# Patient Record
Sex: FEMALE | Race: WHITE | NOT HISPANIC OR LATINO | Employment: OTHER | URBAN - NONMETROPOLITAN AREA
[De-identification: names, ages, dates, MRNs, and addresses within clinical notes are randomized per-mention and may not be internally consistent; named-entity substitution may affect disease eponyms.]

---

## 2017-11-22 ENCOUNTER — HOSPITAL ENCOUNTER (EMERGENCY)
Facility: HOSPITAL | Age: 27
Discharge: HOME | End: 2017-11-22
Attending: NURSE PRACTITIONER | Admitting: NURSE PRACTITIONER

## 2017-11-22 VITALS
SYSTOLIC BLOOD PRESSURE: 139 MMHG | DIASTOLIC BLOOD PRESSURE: 79 MMHG | HEART RATE: 79 BPM | TEMPERATURE: 97.7 F | RESPIRATION RATE: 12 BRPM | OXYGEN SATURATION: 99 %

## 2017-11-22 DIAGNOSIS — H66.90 ACUTE OTITIS MEDIA, UNSPECIFIED OTITIS MEDIA TYPE: ICD-10-CM

## 2017-11-22 PROCEDURE — 99203 OFFICE O/P NEW LOW 30 MIN: CPT | Performed by: NURSE PRACTITIONER

## 2017-11-22 PROCEDURE — 99213 OFFICE O/P EST LOW 20 MIN: CPT

## 2017-11-22 RX ORDER — AMOXICILLIN 875 MG
875 TABLET ORAL 2 TIMES DAILY
Qty: 20 TABLET | Refills: 0 | Status: SHIPPED | OUTPATIENT
Start: 2017-11-22 | End: 2018-11-27

## 2017-11-22 RX ORDER — PREDNISONE 20 MG/1
20 TABLET ORAL DAILY
Qty: 5 TABLET | Refills: 0 | Status: SHIPPED | OUTPATIENT
Start: 2017-11-22 | End: 2018-11-27

## 2017-11-22 ASSESSMENT — ENCOUNTER SYMPTOMS
FACIAL SWELLING: 0
NAUSEA: 1
DIZZINESS: 0
FATIGUE: 1
WEAKNESS: 0
HEADACHES: 1
CARDIOVASCULAR NEGATIVE: 1
SINUS PRESSURE: 0
SINUS PAIN: 0
COUGH: 1
DIARRHEA: 0
MUSCULOSKELETAL NEGATIVE: 1
VOMITING: 1
SPEECH DIFFICULTY: 0
NUMBNESS: 0
SORE THROAT: 0
FEVER: 0
PSYCHIATRIC NEGATIVE: 1

## 2017-11-22 NOTE — DISCHARGE INSTRUCTIONS
"  Otitis Media (Middle-Ear Infection) in Adults  1. Amoxicillin 875mg  2 times a day for 10 days  2. Prednisone 20mg a day for 5 days  3. Drink plenty fluids.  4. Follow up with your Provider-re \"Migraines' Return if HA worsens.      Otitis media is another name for a middle-ear infection. It means an infection behind your eardrum. This kind of ear infection can happen after any condition that keeps fluid from draining from the middle ear. These conditions include allergies, a cold, a sore throat, or a respiratory infection.  Middle-ear infections are common in children, but they can also happen in adults. An ear infection in an adult may mean a more serious problem than in a child. So you may need additional tests. If you have an ear infection, you should see your health care provider for treatment.  What are the types of middle-ear infections?  Infections can affect the middle ear in several ways. They are:    Acute otitis media. This middle-ear infection occurs suddenly. It causes swelling and redness. Fluid and mucus become trapped inside the ear. You can have a fever and ear pain.    Otitis media with effusion. Fluid (effusion) and mucus build up in the middle ear after the infection goes away. You may feel like your middle ear is full. This can continue for months and may affect your hearing.    Chronic otitis media with effusion. Fluid (effusion) remains in the middle ear for a long time. Or it builds up again and again, even though there is no infection. This type of middle-ear infection may be hard to treat. It may also affect your hearing.  Who is more likely to get a middle-ear infection?  You are more likely to get an ear infection if you:    Smoke or are around someone who smokes    Have seasonal or year-round allergy symptoms    Have a cold or other upper respiratory infection  What causes a middle-ear infection?  The middle ear connects to the throat by a canal called the eustachian tube. This tube " helps even out the pressure between the outer ear and the inner ear. A cold or allergy can irritate the tube or cause the area around it to swell. This can keep fluid from draining from the middle ear. The fluid builds up behind the eardrum. Bacteria and viruses can grow in this fluid. The bacteria and viruses cause the middle-ear infection.  What are the symptoms of a middle-ear infection?  Common symptoms of a middle-ear infection in adults are:    Pain in 1 or both ears    Drainage from the ear    Muffled hearing    Sore throat   You may also have a fever. Rarely, your balance can be affected.  These symptoms may be the same as for other conditions. It s important to talk with your health care provider if you think you have a middle-ear infection. If you have a high fever, severe pain behind your ear, or paralysis in your face, see your provider as soon as you can.  How is a middle-ear infection diagnosed?  Your health care provider will take a medical history and do a physical exam. He or she will look at the outer ear and eardrum with an otoscope. The otoscope is a lighted tool that lets your provider see inside the ear. A pneumatic otoscope blows a puff of air into the ear to check how well your eardrum moves. If you eardrum doesn t move well, it may mean you have fluid behind it.  Your provider may also do a test called tympanometry. This test tells how well the middle ear is working. It can find any changes in pressure in the middle ear. Your provider may test your hearing with a tuning fork.  How is a middle-ear infection treated?  A middle-ear infection may be treated with:    Antibiotics, taken by mouth or as ear drops    Medication for pain    Decongestants, antihistamines, or nasal steroids  Your health care provider may also have you try autoinsufflation. This helps adjust the air pressure in your ear. For this, you pinch your nose and gently exhale. This forces air back through the eustachian  tube.  The exact treatment for your ear infection will depend on the type of infection you have. In general, if your symptoms don t get better in 48 to 72 hours, contact your health care provider.  Middle-ear infections can cause long-term problems if not treated. They can lead to:    Infection in other parts of the head    Permanent hearing loss    Paralysis of a nerve in your face  If you have a middle-ear infection that doesn t get better, you may need to see an ear, nose, and throat specialist (otolaryngologist). You may need a CT scan or MRI to check for head and neck cancer.  Ear tubes  Sometimes fluid stays in the middle ear even after you take antibiotics and the infection goes away. In this case, your health care provider may suggest that a small tube be placed in your ear. The tube is put at the opening of the eardrum. The tube keeps fluid from building up and relieves pressure in the middle ear. It can also help you hear better. This surgery is called myringotomy. It is not often done in adults.  The tubes usually fall out on their own after 6 months to a year.    0077-7618 [employer]. [address] [place] [address]. All rights reserved. This information is not intended as a substitute for professional medical care. Always follow your healthcare professional's instructions.          Middle Ear Infection (Adult)  You have an infection of the middle ear, the space behind the eardrum. This is also called acute otitis media (AOM). Sometimes it is caused by the common cold. This is because congestion can block the internal passage (eustachian tube) that drains fluid from the middle ear. When the middle ear fills with fluid, bacteria can grow there and cause an infection. Oral antibiotics are used to treat this illness, not ear drops. Symptoms usually start to improve within 1 to 2 days of treatment.    Home care  The following are general care guidelines:    Finish all of the  antibiotic medicine given, even though you may feel better after the first few days.    You may use over-the-counter medicine, such as acetaminophen or ibuprofen, to control pain and fever, unless something else was prescribed. If you have chronic liver or kidney disease or have ever had a stomach ulcer or gastrointestinal bleeding, talk with your healthcare provider before using these medicines. Do not give aspirin to anyone under 18 years of age who has a fever. It may cause severe illness or death.  Follow-up care  Follow up with your healthcare provider, or as advised, in 2 weeks if all symptoms have not gotten better, or if hearing doesn't go back to normal within 1 month.  When to seek medical advice  Call your healthcare provider right away if any of these occur:    Ear pain gets worse or does not improve after 3 days of treatment    Unusual drowsiness or confusion    Neck pain, stiff neck, or headache    Fluid or blood draining from the ear canal    Fever of 100.4 F (38 C) or as advised     Seizure  Date Last Reviewed: 6/1/2016 2000-2017 [employer]. [address] [place] [address], [place] [address]. All rights reserved. This information is not intended as a substitute for professional medical care. Always follow your healthcare professional's instructions.

## 2017-11-22 NOTE — ED NOTES
Pt presents with a headache to frontal area of forehead and beneath both eyes, states that she was nauseated and vomited last week from it. Pt has had these sx for 3 weeks

## 2017-11-22 NOTE — ED PROVIDER NOTES
History     Chief Complaint   Patient presents with     Headache     frontal, facial X 3 weeks     HPI  Woody Eller is a 27 year old female whohas had Headache that won't go away for 3 weeks.  No URI in beginning. No fever, ear pain, or sore throat.  No postnasal drip-thought sinus, but has no drainage.  Upper teeth hurt-but grinds teeth, and just got a splint to wear at night.  Has TMJ.  Is light sensitive,  No other blurring.  Has to sleep to dull the HA-Ibuprofen and excedrin has not worked  Has had some nausea and vomiting.  Has CO2 detector in house so low probability exposed to carbon monoxide.      Problem List:    Patient Active Problem List    Diagnosis Date Noted     Vomiting, persistent, in adult 2016     Priority: Medium     Acute pyelonephritis 2016     Priority: Medium     Persistent vomiting in adult patient 2016     Priority: Medium     Elevated liver enzymes 2016     Priority: Medium     Status post  delivery 2013     Priority: Medium     Diagnosis updated by automated process. Provider to review and confirm.       Status post  delivery 2013     Priority: Medium     Obesity 2013     Priority: Medium     SVT (supraventricular tachycardia) (H) 2013     Priority: Medium     Drug use 1st trimester 2013     Priority: Medium        Past Medical History:    Past Medical History:   Diagnosis Date     Asthma      Seasonal allergies      SVT (supraventricular tachycardia) (H)        Past Surgical History:    Past Surgical History:   Procedure Laterality Date      SECTION  2013    Procedure:  SECTION;;  Surgeon: Mally Danielson MD;  Location: [facility]     TONSILLECTOMY         Family History:    Family History   Problem Relation Age of Onset     DIABETES Sister      Type 1     DIABETES Paternal Grandmother      Type 2     Thyroid Disease Mother      Hypothyroidism     HEART DISEASE Maternal Grandmother       Pacemaker     Thyroid Disease Maternal Grandmother      Thyroid Disorder       Social History:  Marital Status:  Single [1]  Social History   Substance Use Topics     Smoking status: Former Smoker     Smokeless tobacco: Never Used     Alcohol use No        Medications:      amoxicillin (AMOXIL) 875 MG tablet   predniSONE (DELTASONE) 20 MG tablet   Diazepam (VALIUM PO)   ibuprofen (ADVIL,MOTRIN) 800 MG tablet         Review of Systems   Constitutional: Positive for fatigue. Negative for fever.   HENT: Negative for congestion, ear pain, facial swelling, postnasal drip, sinus pain, sinus pressure and sore throat.    Respiratory: Positive for cough.    Cardiovascular: Negative.    Gastrointestinal: Positive for nausea and vomiting. Negative for diarrhea.        With HA.     Genitourinary: Negative.    Musculoskeletal: Negative.    Skin: Negative.    Neurological: Positive for headaches. Negative for dizziness, speech difficulty, weakness and numbness.   Psychiatric/Behavioral: Negative.        Physical Exam   BP: 139/79  Pulse: 79  Temp: 97.7  F (36.5  C)  Resp: 12  SpO2: 99 %      Physical Exam   Constitutional: She is oriented to person, place, and time. She appears well-developed and well-nourished.   HENT:   Left Ear: External ear normal.   Nose: Nose normal.   Mouth/Throat: Oropharynx is clear and moist.   Right TM red, retracted.  Left-pearly gray partially obscured by cerumen.  Has Audible click in TMJ joint.    Eyes: Conjunctivae and EOM are normal. Pupils are equal, round, and reactive to light.   Neck: Normal range of motion. Neck supple. No thyromegaly present.   Cardiovascular: Normal rate, regular rhythm and normal heart sounds.    No murmur heard.  Pulmonary/Chest: Effort normal and breath sounds normal.   Musculoskeletal: Normal range of motion.   Lymphadenopathy:     She has no cervical adenopathy.   Neurological: She is alert and oriented to person, place, and time. No cranial nerve deficit.   Skin:  Skin is warm and dry. No rash noted.   Psychiatric: She has a normal mood and affect.       ED Course     ED Course     Procedures      Labs Ordered and Resulted from Time of ED Arrival Up to the Time of Departure from the ED - No data to display    Assessments & Plan (with Medical Decision Making)     I have reviewed the nursing notes.    I have reviewed the findings, diagnosis, plan and need for follow up with the patient.Discussed doing CT of head for these ongoing headaches-she calls Migraines.  -especially since had HA for 3 weeks-But also has the ear infection.  Will see Dr. Arin Lopez on Friday.        New Prescriptions    AMOXICILLIN (AMOXIL) 875 MG TABLET    Take 1 tablet (875 mg) by mouth 2 times daily    PREDNISONE (DELTASONE) 20 MG TABLET    Take 1 tablet (20 mg) by mouth daily       Final diagnoses:   Acute otitis media, unspecified otitis media type     ASSESSMENT / PLAN:  (H66.90) Acute otitis media, unspecified otitis media type  Comment:   Plan:   1.  Amoxicillin 875mg BID for 10 days  2.  Prednisone 20mg a day for 5 days  3. Drink plenty fluids  4.  Follow up with Provider-re: headaches.  Return to ER if worsens.        11/22/2017   [facility]     Delon Vee NP  11/22/17 1439       Delon Vee NP  11/22/17 1219

## 2017-11-22 NOTE — ED AVS SNAPSHOT
[facility]    [address] [address]    Phone:  [phone]                                       Woody Eller   MRN: [id number]    Department:  [facility]   Date of Visit:  11/22/2017           After Visit Summary Signature Page     I have received my discharge instructions, and my questions have been answered. I have discussed any challenges I see with this plan with the nurse or doctor.    ..........................................................................................................................................  Patient/Patient Representative Signature      ..........................................................................................................................................  Patient Representative Print Name and Relationship to Patient    ..................................................               ................................................  Date                                            Time    ..........................................................................................................................................  Reviewed by Signature/Title    ...................................................              ..............................................  Date                                                            Time

## 2017-11-22 NOTE — ED AVS SNAPSHOT
" [facility]    [address] [address]    Phone:  [phone]                                       Woody Eller   MRN: [id number]    Department:  [facility]   Date of Visit:  11/22/2017           Patient Information     Date Of Birth          1990        Your diagnoses for this visit were:     Acute otitis media, unspecified otitis media type        You were seen by Delon Vee NP.      Follow-up Information     Follow up with Annette Ervin MD.    Specialty:  Family Practice    Contact information:    [facility]  [address] [facility] [address]  [phone]          Discharge Instructions         Otitis Media (Middle-Ear Infection) in Adults  1. Amoxicillin 875mg  2 times a day for 10 days  2. Prednisone 20mg a day for 5 days  3. Drink plenty fluids.  4. Follow up with your Provider-re \"Migraines' Return if HA worsens.      Otitis media is another name for a middle-ear infection. It means an infection behind your eardrum. This kind of ear infection can happen after any condition that keeps fluid from draining from the middle ear. These conditions include allergies, a cold, a sore throat, or a respiratory infection.  Middle-ear infections are common in children, but they can also happen in adults. An ear infection in an adult may mean a more serious problem than in a child. So you may need additional tests. If you have an ear infection, you should see your health care provider for treatment.  What are the types of middle-ear infections?  Infections can affect the middle ear in several ways. They are:    Acute otitis media. This middle-ear infection occurs suddenly. It causes swelling and redness. Fluid and mucus become trapped inside the ear. You can have a fever and ear pain.    Otitis media with effusion. Fluid (effusion) and mucus build up in the middle ear after the infection goes away. You may feel like your middle ear is " full. This can continue for months and may affect your hearing.    Chronic otitis media with effusion. Fluid (effusion) remains in the middle ear for a long time. Or it builds up again and again, even though there is no infection. This type of middle-ear infection may be hard to treat. It may also affect your hearing.  Who is more likely to get a middle-ear infection?  You are more likely to get an ear infection if you:    Smoke or are around someone who smokes    Have seasonal or year-round allergy symptoms    Have a cold or other upper respiratory infection  What causes a middle-ear infection?  The middle ear connects to the throat by a canal called the eustachian tube. This tube helps even out the pressure between the outer ear and the inner ear. A cold or allergy can irritate the tube or cause the area around it to swell. This can keep fluid from draining from the middle ear. The fluid builds up behind the eardrum. Bacteria and viruses can grow in this fluid. The bacteria and viruses cause the middle-ear infection.  What are the symptoms of a middle-ear infection?  Common symptoms of a middle-ear infection in adults are:    Pain in 1 or both ears    Drainage from the ear    Muffled hearing    Sore throat   You may also have a fever. Rarely, your balance can be affected.  These symptoms may be the same as for other conditions. It s important to talk with your health care provider if you think you have a middle-ear infection. If you have a high fever, severe pain behind your ear, or paralysis in your face, see your provider as soon as you can.  How is a middle-ear infection diagnosed?  Your health care provider will take a medical history and do a physical exam. He or she will look at the outer ear and eardrum with an otoscope. The otoscope is a lighted tool that lets your provider see inside the ear. A pneumatic otoscope blows a puff of air into the ear to check how well your eardrum moves. If you eardrum doesn t  move well, it may mean you have fluid behind it.  Your provider may also do a test called tympanometry. This test tells how well the middle ear is working. It can find any changes in pressure in the middle ear. Your provider may test your hearing with a tuning fork.  How is a middle-ear infection treated?  A middle-ear infection may be treated with:    Antibiotics, taken by mouth or as ear drops    Medication for pain    Decongestants, antihistamines, or nasal steroids  Your health care provider may also have you try autoinsufflation. This helps adjust the air pressure in your ear. For this, you pinch your nose and gently exhale. This forces air back through the eustachian tube.  The exact treatment for your ear infection will depend on the type of infection you have. In general, if your symptoms don t get better in 48 to 72 hours, contact your health care provider.  Middle-ear infections can cause long-term problems if not treated. They can lead to:    Infection in other parts of the head    Permanent hearing loss    Paralysis of a nerve in your face  If you have a middle-ear infection that doesn t get better, you may need to see an ear, nose, and throat specialist (otolaryngologist). You may need a CT scan or MRI to check for head and neck cancer.  Ear tubes  Sometimes fluid stays in the middle ear even after you take antibiotics and the infection goes away. In this case, your health care provider may suggest that a small tube be placed in your ear. The tube is put at the opening of the eardrum. The tube keeps fluid from building up and relieves pressure in the middle ear. It can also help you hear better. This surgery is called myringotomy. It is not often done in adults.  The tubes usually fall out on their own after 6 months to a year.    [id number] [employer]. [address] [place] [address], [place] [address]. All rights reserved. This information is not intended as a substitute for professional medical  care. Always follow your healthcare professional's instructions.          Middle Ear Infection (Adult)  You have an infection of the middle ear, the space behind the eardrum. This is also called acute otitis media (AOM). Sometimes it is caused by the common cold. This is because congestion can block the internal passage (eustachian tube) that drains fluid from the middle ear. When the middle ear fills with fluid, bacteria can grow there and cause an infection. Oral antibiotics are used to treat this illness, not ear drops. Symptoms usually start to improve within 1 to 2 days of treatment.    Home care  The following are general care guidelines:    Finish all of the antibiotic medicine given, even though you may feel better after the first few days.    You may use over-the-counter medicine, such as acetaminophen or ibuprofen, to control pain and fever, unless something else was prescribed. If you have chronic liver or kidney disease or have ever had a stomach ulcer or gastrointestinal bleeding, talk with your healthcare provider before using these medicines. Do not give aspirin to anyone under 18 years of age who has a fever. It may cause severe illness or death.  Follow-up care  Follow up with your healthcare provider, or as advised, in 2 weeks if all symptoms have not gotten better, or if hearing doesn't go back to normal within 1 month.  When to seek medical advice  Call your healthcare provider right away if any of these occur:    Ear pain gets worse or does not improve after 3 days of treatment    Unusual drowsiness or confusion    Neck pain, stiff neck, or headache    Fluid or blood draining from the ear canal    Fever of 100.4 F (38 C) or as advised     Seizure  Date Last Reviewed: 6/1/2016 2000-2017 [employer]. [address] [place] [address], [place] [address]. All rights reserved. This information is not intended as a substitute for professional medical care. Always follow your healthcare  professional's instructions.             Review of your medicines      START taking        Dose / Directions Last dose taken    amoxicillin 875 MG tablet   Commonly known as:  AMOXIL   Dose:  875 mg   Quantity:  20 tablet        Take 1 tablet (875 mg) by mouth 2 times daily   Refills:  0        predniSONE 20 MG tablet   Commonly known as:  DELTASONE   Dose:  20 mg   Quantity:  5 tablet        Take 1 tablet (20 mg) by mouth daily   Refills:  0          Our records show that you are taking the medicines listed below. If these are incorrect, please call your family doctor or clinic.        Dose / Directions Last dose taken    ibuprofen 800 MG tablet   Commonly known as:  ADVIL/MOTRIN   Dose:  800 mg   Quantity:  40 tablet        Take 1 tablet (800 mg) by mouth every 8 hours as needed for pain   Refills:  1        VALIUM PO        Take by mouth as needed for anxiety   Refills:  0                Prescriptions were sent or printed at these locations (2 Prescriptions)                   [facility]  [address]   [address] [facility] [place] [address]    Telephone:  [phone]   Fax:  [phone]   Hours:                  E-Prescribed (2 of 2)         amoxicillin (AMOXIL) 875 MG tablet               predniSONE (DELTASONE) 20 MG tablet                Orders Needing Specimen Collection     None      Pending Results     No orders found from 11/20/2017 to 11/23/2017.            Pending Culture Results     No orders found from 11/20/2017 to 11/23/2017.            Thank you for choosing [facility]       Thank you for choosing [facility] for your care. Our goal is always to provide you with excellent care. Hearing back from our patients is one way we can continue to improve our services. Please take a few minutes to complete the written survey that you may receive in the mail after you visit with us. Thank you!        [employer] Information     Integrated biometrics lets you send messages to your doctor, view your test  "results, renew your prescriptions, schedule appointments and more. To sign up, go to [email or web address] . Click on \"Log in\" on the left side of the screen, which will take you to the Welcome page. Then click on \"Sign up Now\" on the right side of the page.     You will be asked to enter the access code listed below, as well as some personal information. Please follow the directions to create your username and password.     Your access code is: [email or web address]  Expires: 2018  2:23 PM     Your access code will  in 90 days. If you need help or a new code, please call your [facility] clinic or [phone].        Care EveryWhere ID     This is your Care EveryWhere ID. This could be used by other organizations to access your [facility] medical records  [id number]        Equal Access to Services     ISMA PABLO : Hadshasha Cabello, enedelia leung, jessica michelalmapérez snow, mariela vang . So [facility] [phone].    ATENCIÓN: Si habla español, tiene a poe disposición servicios gratuitos de asistencia lingüística. Llame al [phone].    We comply with applicable federal civil rights laws and [place] laws. We do not discriminate on the basis of race, color, national origin, age, disability, sex, sexual orientation, or gender identity.            After Visit Summary       This is your record. Keep this with you and show to your community pharmacist(s) and doctor(s) at your next visit.                  "

## 2018-10-03 ENCOUNTER — HOSPITAL ENCOUNTER (EMERGENCY)
Facility: HOSPITAL | Age: 28
Discharge: HOME | End: 2018-10-03
Attending: NURSE PRACTITIONER | Admitting: NURSE PRACTITIONER
Payer: COMMERCIAL

## 2018-10-03 VITALS
RESPIRATION RATE: 14 BRPM | OXYGEN SATURATION: 97 % | DIASTOLIC BLOOD PRESSURE: 80 MMHG | SYSTOLIC BLOOD PRESSURE: 127 MMHG | TEMPERATURE: 98.5 F

## 2018-10-03 DIAGNOSIS — J01.00 SUBACUTE MAXILLARY SINUSITIS: ICD-10-CM

## 2018-10-03 DIAGNOSIS — J40 BRONCHITIS: ICD-10-CM

## 2018-10-03 PROCEDURE — 99213 OFFICE O/P EST LOW 20 MIN: CPT | Performed by: NURSE PRACTITIONER

## 2018-10-03 PROCEDURE — G0463 HOSPITAL OUTPT CLINIC VISIT: HCPCS

## 2018-10-03 RX ORDER — ONDANSETRON 4 MG/1
4 TABLET, ORALLY DISINTEGRATING ORAL EVERY 8 HOURS PRN
Qty: 10 TABLET | Refills: 0 | Status: SHIPPED | OUTPATIENT
Start: 2018-10-03 | End: 2018-10-06

## 2018-10-03 RX ORDER — CODEINE PHOSPHATE AND GUAIFENESIN 10; 100 MG/5ML; MG/5ML
1-2 SOLUTION ORAL EVERY 4 HOURS PRN
Qty: 180 ML | Refills: 0 | Status: SHIPPED | OUTPATIENT
Start: 2018-10-03 | End: 2018-11-27

## 2018-10-03 RX ORDER — AZITHROMYCIN 250 MG/1
TABLET, FILM COATED ORAL
Qty: 6 TABLET | Refills: 0 | Status: SHIPPED | OUTPATIENT
Start: 2018-10-03 | End: 2018-10-08

## 2018-10-03 ASSESSMENT — ENCOUNTER SYMPTOMS
SINUS PRESSURE: 1
NAUSEA: 1
DIAPHORESIS: 1
CONSTIPATION: 1
HEADACHES: 1
MYALGIAS: 1
CHILLS: 1
DIZZINESS: 0
VOMITING: 1
APPETITE CHANGE: 1
SORE THROAT: 0
DIFFICULTY URINATING: 0
FATIGUE: 1
PALPITATIONS: 0
ABDOMINAL PAIN: 0
COUGH: 1
DIARRHEA: 0
ARTHRALGIAS: 1
FEVER: 0
RHINORRHEA: 1
PSYCHIATRIC NEGATIVE: 1
SHORTNESS OF BREATH: 0

## 2018-10-03 NOTE — DISCHARGE INSTRUCTIONS
Bronchitis, Antibiotic Treatment (Adult)  1. Zpak  500mg today, 250mg a day for 4 days  2. Robitussin AC  For cough 1-2 tsp every 4-6 hours.    3.  Zofran 4mg  Up to every 8 hours as needed for nausea.   4. Keep well hydrated.         Bronchitis is an infection of the air passages (bronchial tubes) in your lungs. It often occurs when you have a cold. This illness is contagious during the first few days and is spread through the air by coughing and sneezing, or by direct contact (touching the sick person and then touching your own eyes, nose, or mouth).  Symptoms of bronchitis include cough with mucus (phlegm) and low-grade fever. Bronchitis usually lasts 7 to 14 days. Mild cases can be treated with simple home remedies. More severe infection is treated with an antibiotic.  Home care  Follow these guidelines when caring for yourself at home:    If your symptoms are severe, rest at home for the first 2 to 3 days. When you go back to your usual activities, don't let yourself get too tired.    Do not smoke. Also avoid being exposed to secondhand smoke.    You may use over-the-counter medicines to control fever or pain, unless another medicine was prescribed. If you have chronic liver or kidney disease or have ever had a stomach ulcer or gastrointestinal bleeding, talk with your healthcare provider before using these medicines. Also talk to your provider if you are taking medicine to prevent blood clots. Aspirin should never be given to anyone younger than 18 years of age who is ill with a viral infection or fever. It may cause severe liver or brain damage.    Your appetite may be poor, so a light diet is fine. Avoid dehydration by drinking 6 to 8 glasses of fluids per day (such as water, soft drinks, sports drinks, juices, tea, or soup). Extra fluids will help loosen secretions in the nose and lungs.    Over-the-counter cough, cold, and sore-throat medicines will not shorten the length of the illness, but they may be  helpful to reduce symptoms. (Note: Do not use decongestants if you have high blood pressure.)    Finish all antibiotic medicine. Do this even if you are feeling better after only a few days.  Follow-up care  Follow up with your healthcare provider, or as advised. If you had an X-ray or ECG (electrocardiogram), a specialist will review it. You will be notified of any new findings that may affect your care.  If you are age 65 or older, or if you have a chronic lung disease or condition that affects your immune system, or you smoke, ask your healthcare provider about getting a pneumococcal vaccine and a yearly flu shot (influenza vaccine).  When to seek medical advice  Call your healthcare provider right away if any of these occur:    Fever of 100.4 F (38 C) or higher, or as directed by your healthcare provider    Coughing up increased amounts of colored sputum    Weakness, drowsiness, headache, facial pain, ear pain, or a stiff neck  Call 911  Call 911 if any of these occur.    Coughing up blood    Worsening weakness, drowsiness, headache, or stiff neck    Trouble breathing, wheezing, or pain with breathing  Date Last Reviewed: 9/13/2015 2000-2017 [employer]. [address] [place] [address], [place] [address]. All rights reserved. This information is not intended as a substitute for professional medical care. Always follow your healthcare professional's instructions.

## 2018-10-03 NOTE — ED PROVIDER NOTES
History     Chief Complaint   Patient presents with     Headache     c/o migraine ha, vomiting, body aches and head cold     HPI  Woody Clark is a 28 year old female who has been sick for 6 days with  Chills,  HA,   +postnasal drip, sinus pressure,  Gums ache,  Cough, vomiting up much phlegmy vomit. Body aches.  No Diarrhea,  No ear ache.      Problem List:    Patient Active Problem List    Diagnosis Date Noted     Vomiting, persistent, in adult 2016     Priority: Medium     Acute pyelonephritis 2016     Priority: Medium     Persistent vomiting in adult patient 2016     Priority: Medium     Elevated liver enzymes 2016     Priority: Medium     Status post  delivery 2013     Priority: Medium     Diagnosis updated by automated process. Provider to review and confirm.       Status post  delivery 2013     Priority: Medium     Obesity 2013     Priority: Medium     SVT (supraventricular tachycardia) (H) 2013     Priority: Medium     Drug use 1st trimester 2013     Priority: Medium        Past Medical History:    Past Medical History:   Diagnosis Date     Asthma      Seasonal allergies      SVT (supraventricular tachycardia) (H)        Past Surgical History:    Past Surgical History:   Procedure Laterality Date      SECTION  2013    Procedure:  SECTION;;  Surgeon: Mally Danielson MD;  Location: [facility]     TONSILLECTOMY         Family History:    Family History   Problem Relation Age of Onset     Diabetes Sister      Type 1     Diabetes Paternal Grandmother      Type 2     Thyroid Disease Mother      Hypothyroidism     HEART DISEASE Maternal Grandmother      Pacemaker     Thyroid Disease Maternal Grandmother      Thyroid Disorder       Social History:  Marital Status:   [2]  Social History   Substance Use Topics     Smoking status: Former Smoker     Smokeless tobacco: Never Used     Alcohol use No        Medications:       azithromycin (ZITHROMAX Z-RYANNE) 250 MG tablet   guaiFENesin-codeine (ROBITUSSIN AC) 100-10 MG/5ML SOLN solution   ondansetron (ZOFRAN ODT) 4 MG ODT tab   amoxicillin (AMOXIL) 875 MG tablet   Diazepam (VALIUM PO)   ibuprofen (ADVIL,MOTRIN) 800 MG tablet   predniSONE (DELTASONE) 20 MG tablet         Review of Systems   Constitutional: Positive for appetite change, chills, diaphoresis and fatigue. Negative for fever.   HENT: Positive for congestion, ear pain, postnasal drip, rhinorrhea and sinus pressure. Negative for sore throat.         Gums ache.     Respiratory: Positive for cough. Negative for shortness of breath.    Cardiovascular: Negative for chest pain, palpitations and leg swelling.   Gastrointestinal: Positive for constipation, nausea and vomiting. Negative for abdominal pain and diarrhea.   Genitourinary: Negative for difficulty urinating.   Musculoskeletal: Positive for arthralgias and myalgias.   Neurological: Positive for headaches. Negative for dizziness.   Psychiatric/Behavioral: Negative.        Physical Exam   BP: 127/80  Heart Rate: 81  Temp: 98.5  F (36.9  C)  Resp: 14  SpO2: 97 %      Physical Exam   Constitutional: She is oriented to person, place, and time. She appears well-developed and well-nourished.   HENT:   Left and right TM's obscured by dry cerumen.   Nares red , swollen,  Oropharynx reddened.    Eyes: Conjunctivae and EOM are normal. Pupils are equal, round, and reactive to light.   Neck: Normal range of motion. Neck supple. No thyromegaly present.   Feels clammy.    Cardiovascular: Normal rate, regular rhythm and normal heart sounds.    No murmur heard.  Pulmonary/Chest: Effort normal.   Breath sounds decreased posteriorly     Musculoskeletal: Normal range of motion.   Lymphadenopathy:     She has no cervical adenopathy.   Neurological: She is alert and oriented to person, place, and time.   Skin: Skin is warm and dry.   Psychiatric: She has a normal mood and affect.       ED Course      ED Course     Procedures                   No results found for this or any previous visit (from the past 24 hour(s)).    Medications - No data to display    Assessments & Plan (with Medical Decision Making)     I have reviewed the nursing notes.    I have reviewed the findings, diagnosis, plan and need for follow up with the patient.      New Prescriptions    AZITHROMYCIN (ZITHROMAX Z-RYANNE) 250 MG TABLET    Two tablets on the first day, then one tablet daily for the next 4 days    GUAIFENESIN-CODEINE (ROBITUSSIN AC) 100-10 MG/5ML SOLN SOLUTION    Take 5-10 mLs by mouth every 4 hours as needed    ONDANSETRON (ZOFRAN ODT) 4 MG ODT TAB    Take 1 tablet (4 mg) by mouth every 8 hours as needed for nausea       Final diagnoses:   Subacute maxillary sinusitis   Bronchitis     ASSESSMENT / PLAN:  (J01.00) Subacute maxillary sinusitis  Comment:   Plan: 1. Zpak  500mg today , 250mg a day for 4 days.   2. Zofran 4mg  Up to every 8 hours for nausea/vomiting.       (J40) Bronchitis  Comment:   Plan:  1.   Zpak 500mg today, 250mg a day for 4 days  2. Robitussin AC 1-2 tsp every 4-6 hour for cough.        10/3/2018   [facility]     Delon Vee NP  10/03/18 [id number]

## 2018-10-03 NOTE — ED AVS SNAPSHOT
[facility]    [address] [address]    Phone:  [phone]                                       Woody Clark   MRN: [id number]    Department[facility]  [facility]   Date of Visit:  10/3/2018           Patient Information     Date Of Birth          1990        Your diagnoses for this visit were:     Subacute maxillary sinusitis     Bronchitis        You were seen by Delon Vee NP.      Follow-up Information     Follow up with Annette Ervin MD.    Specialty:  Family Practice    Contact information:    [facility]  [address] [facility] [address]  [phone]          Discharge Instructions         Bronchitis, Antibiotic Treatment (Adult)  1. Zpak  500mg today, 250mg a day for 4 days  2. Robitussin AC  For cough 1-2 tsp every 4-6 hours.    3.  Zofran 4mg  Up to every 8 hours as needed for nausea.   4. Keep well hydrated.         Bronchitis is an infection of the air passages (bronchial tubes) in your lungs. It often occurs when you have a cold. This illness is contagious during the first few days and is spread through the air by coughing and sneezing, or by direct contact (touching the sick person and then touching your own eyes, nose, or mouth).  Symptoms of bronchitis include cough with mucus (phlegm) and low-grade fever. Bronchitis usually lasts 7 to 14 days. Mild cases can be treated with simple home remedies. More severe infection is treated with an antibiotic.  Home care  Follow these guidelines when caring for yourself at home:    If your symptoms are severe, rest at home for the first 2 to 3 days. When you go back to your usual activities, don't let yourself get too tired.    Do not smoke. Also avoid being exposed to secondhand smoke.    You may use over-the-counter medicines to control fever or pain, unless another medicine was prescribed. If you have chronic liver or kidney disease or have ever had a stomach ulcer or  gastrointestinal bleeding, talk with your healthcare provider before using these medicines. Also talk to your provider if you are taking medicine to prevent blood clots. Aspirin should never be given to anyone younger than 18 years of age who is ill with a viral infection or fever. It may cause severe liver or brain damage.    Your appetite may be poor, so a light diet is fine. Avoid dehydration by drinking 6 to 8 glasses of fluids per day (such as water, soft drinks, sports drinks, juices, tea, or soup). Extra fluids will help loosen secretions in the nose and lungs.    Over-the-counter cough, cold, and sore-throat medicines will not shorten the length of the illness, but they may be helpful to reduce symptoms. (Note: Do not use decongestants if you have high blood pressure.)    Finish all antibiotic medicine. Do this even if you are feeling better after only a few days.  Follow-up care  Follow up with your healthcare provider, or as advised. If you had an X-ray or ECG (electrocardiogram), a specialist will review it. You will be notified of any new findings that may affect your care.  If you are age 65 or older, or if you have a chronic lung disease or condition that affects your immune system, or you smoke, ask your healthcare provider about getting a pneumococcal vaccine and a yearly flu shot (influenza vaccine).  When to seek medical advice  Call your healthcare provider right away if any of these occur:    Fever of 100.4 F (38 C) or higher, or as directed by your healthcare provider    Coughing up increased amounts of colored sputum    Weakness, drowsiness, headache, facial pain, ear pain, or a stiff neck  Call 911  Call 911 if any of these occur.    Coughing up blood    Worsening weakness, drowsiness, headache, or stiff neck    Trouble breathing, wheezing, or pain with breathing  Date Last Reviewed: 9/13/2015 2000-2017 [employer]. [address] [place] [address], [place] [address]. All rights  reserved. This information is not intended as a substitute for professional medical care. Always follow your healthcare professional's instructions.          Discharge References/Attachments     SINUSITIS (ANTIBIOTIC TREATMENT) (ENGLISH)    BRONCHITIS, ANTIBIOTIC TREATMENT (ADULT) (ENGLISH)         Review of your medicines      START taking        Dose / Directions Last dose taken    azithromycin 250 MG tablet   Commonly known as:  ZITHROMAX Z-RYANNE   Quantity:  6 tablet        Two tablets on the first day, then one tablet daily for the next 4 days   Refills:  0        guaiFENesin-codeine 100-10 MG/5ML Soln solution   Commonly known as:  ROBITUSSIN AC   Dose:  1-2 tsp.   Quantity:  180 mL        Take 5-10 mLs by mouth every 4 hours as needed   Refills:  0        ondansetron 4 MG ODT tab   Commonly known as:  ZOFRAN ODT   Dose:  4 mg   Quantity:  10 tablet        Take 1 tablet (4 mg) by mouth every 8 hours as needed for nausea   Refills:  0          Our records show that you are taking the medicines listed below. If these are incorrect, please call your family doctor or clinic.        Dose / Directions Last dose taken    amoxicillin 875 MG tablet   Commonly known as:  AMOXIL   Dose:  875 mg   Quantity:  20 tablet        Take 1 tablet (875 mg) by mouth 2 times daily   Refills:  0        ibuprofen 800 MG tablet   Commonly known as:  ADVIL/MOTRIN   Dose:  800 mg   Quantity:  40 tablet        Take 1 tablet (800 mg) by mouth every 8 hours as needed for pain   Refills:  1        predniSONE 20 MG tablet   Commonly known as:  DELTASONE   Dose:  20 mg   Quantity:  5 tablet        Take 1 tablet (20 mg) by mouth daily   Refills:  0        VALIUM PO        Take by mouth as needed for anxiety   Refills:  0                Prescriptions were sent or printed at these locations (3 Prescriptions)                   [facility] [address] - [place] - [address] AT [facility] [address], [place] [address]  "   Telephone:  [phone]   Fax:  [phone]   Hours:                  E-Prescribed (2 of 3)         azithromycin (ZITHROMAX Z-RYANNE) 250 MG tablet               ondansetron (ZOFRAN ODT) 4 MG ODT tab                 Printed at Department/Unit printer (1 of 3)         guaiFENesin-codeine (ROBITUSSIN AC) 100-10 MG/5ML SOLN solution                Orders Needing Specimen Collection     None      Pending Results     No orders found from 10/1/2018 to 10/4/2018.            Pending Culture Results     No orders found from 10/1/2018 to 10/4/2018.            Thank you for choosing [facility]       Thank you for choosing [facility] for your care. Our goal is always to provide you with excellent care. Hearing back from our patients is one way we can continue to improve our services. Please take a few minutes to complete the written survey that you may receive in the mail after you visit with us. Thank you!        [employer] Information     [employer] lets you send messages to your doctor, view your test results, renew your prescriptions, schedule appointments and more. To sign up, go to [email or web address] . Click on \"Log in\" on the left side of the screen, which will take you to the Welcome page. Then click on \"Sign up Now\" on the right side of the page.     You will be asked to enter the access code listed below, as well as some personal information. Please follow the directions to create your username and password.     Your access code is: [email or web address]  Expires: 2019  6:07 PM     Your access code will  in 90 days. If you need help or a new code, please call your [facility] clinic or [phone].        Care EveryWhere ID     This is your Care EveryWhere ID. This could be used by other organizations to access your [facility] medical records  [id number]        Equal Access to Services     ISMA PABLO : Nat Cabello, wamare leung, qaybta marquez snow, mariela crowley. " So [facility] [phone].    ATENCIÓN: Si habla español, tiene a poe disposición servicios gratuitos de asistencia lingüística. Llame al [phone].    We comply with applicable federal civil rights laws and [place] laws. We do not discriminate on the basis of race, color, national origin, age, disability, sex, sexual orientation, or gender identity.            After Visit Summary       This is your record. Keep this with you and show to your community pharmacist(s) and doctor(s) at your next visit.

## 2018-10-03 NOTE — ED AVS SNAPSHOT
[facility]    [address] [address]    Phone:  [phone]                                       Woody Clark   MRN: [id number]    Department[facility]  [facility]   Date of Visit:  10/3/2018           After Visit Summary Signature Page     I have received my discharge instructions, and my questions have been answered. I have discussed any challenges I see with this plan with the nurse or doctor.    ..........................................................................................................................................  Patient/Patient Representative Signature      ..........................................................................................................................................  Patient Representative Print Name and Relationship to Patient    ..................................................               ................................................  Date                                   Time    ..........................................................................................................................................  Reviewed by Signature/Title    ...................................................              ..............................................  Date                                               Time          22EPIC Rev 08/18

## 2018-10-22 ENCOUNTER — OFFICE VISIT (OUTPATIENT)
Dept: CHIROPRACTIC MEDICINE | Facility: OTHER | Age: 28
End: 2018-10-22
Attending: CHIROPRACTOR
Payer: COMMERCIAL

## 2018-10-22 DIAGNOSIS — M99.03 SEGMENTAL AND SOMATIC DYSFUNCTION OF LUMBAR REGION: ICD-10-CM

## 2018-10-22 DIAGNOSIS — M99.01 SEGMENTAL AND SOMATIC DYSFUNCTION OF CERVICAL REGION: Primary | ICD-10-CM

## 2018-10-22 DIAGNOSIS — M99.02 SEGMENTAL AND SOMATIC DYSFUNCTION OF THORACIC REGION: ICD-10-CM

## 2018-10-22 DIAGNOSIS — M54.2 CERVICALGIA: ICD-10-CM

## 2018-10-22 PROCEDURE — 98941 CHIROPRACT MANJ 3-4 REGIONS: CPT | Mod: AT | Performed by: CHIROPRACTOR

## 2018-10-22 NOTE — PROGRESS NOTES
1    Subjective Finding:    Chief compalint: Patient presents with:  Back Pain  Neck Pain  , Pain Scale: 2/10, Intensity: sharp, Duration: 2 weeks, Radiating: no.    Date of injury:     Activities that the pain restricts:   Home/household/hobbies/social activities: yes.  Work duties: no.  Sleep: no.  Makes symptoms better: rest.  Makes symptoms worse: lumbar flexion.  Have you seen anyone else for the symptoms? No.  Work related: no.  Automobile related injury: no.    Objective and Assessment:    Posture Analysis:   High shoulder: .  Head tilt: .  High iliac crest: .  Head carriage: neutral.  Thoracic Kyphosis: neutral.  Lumbar Lordosis: forward.    Lumbar Range of Motion: extension decreased.  Cervical Range of Motion: .  Thoracic Range of Motion: extension decreased.  Extremity Range of Motion: .    Palpation:   Quad lumb: bilateral, referred pain: no  T paraspinals: dull pain, no    Segmental dysfunction pre-treatment and treatment area: C7, T8, T9 and L5.    Assessment post-treatment:  Cervical: ROM increased.  Thoracic: ROM increased.  Lumbar: ROM increased.    Comments: .      Complicating Factors: .    Procedure(s):  [facility]:  [address] Chiropractic manipulative treatment 3-4 regions performed   Cervical: Diversified, See above for level, Supine, Thoracic: Diversified, See above for level, Prone and Lumbar: Diversified, See above for level, Side posture    Modalities:  None performed this visit    Therapeutic procedures:  None    Plan:  Treatment plan: PRN.  Instructed patient: ice 20 minutes every other hour as needed.  Short term goals: increase ROM.  Long term goals: increase ADL.  Prognosis: excellent.

## 2018-10-22 NOTE — MR AVS SNAPSHOT
"              After Visit Summary   10/22/2018    Woody Clark    MRN: [id number]           Patient Information     Date Of Birth          1990        Visit Information        Provider Department      10/22/2018 8:20 AM Isaac Tre[facility]        Today's Diagnoses     Segmental and somatic dysfunction of cervical region    -  1    Cervicalgia        Segmental and somatic dysfunction of lumbar region        Segmental and somatic dysfunction of thoracic region           Follow-ups after your visit        Who to contact     If you have questions or need follow up information about today's clinic visit or your schedule please contact  [facility] directly at [phone].  Normal or non-critical lab and imaging results will be communicated to you by [employer], letter or phone within 4 business days after the clinic has received the results. If you do not hear from us within 7 days, please contact the clinic through [employer] or phone. If you have a critical or abnormal lab result, we will notify you by phone as soon as possible.  Submit refill requests through [employer] or call your pharmacy and they will forward the refill request to us. Please allow 3 business days for your refill to be completed.          Additional Information About Your Visit        MyChart Information     [employer] lets you send messages to your doctor, view your test results, renew your prescriptions, schedule appointments and more. To sign up, go to [email or web address] . Click on \"Log in\" on the left side of the screen, which will take you to the Welcome page. Then click on \"Sign up Now\" on the right side of the page.     You will be asked to enter the access code listed below, as well as some personal information. Please follow the directions to create your username and password.     Your access code is: [email or web address]  Expires: 2019  6:07 PM     Your access code will  in 90 days. If you need help or " a new code, please call your [facility] clinic or [phone].        Care EveryWhere ID     This is your Care EveryWhere ID. This could be used by other organizations to access your [facility] medical records  [id number]         Blood Pressure from Last 3 Encounters:   10/03/18 127/80   11/22/17 139/79   06/02/16 116/62    Weight from Last 3 Encounters:   03/01/16 166 lb 0.1 oz (75.3 kg)   08/16/13 220 lb (99.8 kg)   08/09/13 242 lb (109.8 kg)              We Performed the Following     CHIROPRAC MANIP,SPINAL,3-4 REGIONS        Primary Care Provider Office Phone # Fax #    Annette Ervin [phone]       [facility] [address]  [facility] [address]        Equal Access to Services     [facility]: Hadii aad ku hadasho Sotiffanie, waaxda luqadaha, qaybta kaalmada adevaldezyada, mariela malin haywilly vang . So [facility] [phone].    ATENCIÓN: Si habla español, tiene a poe disposición servicios gratuitos de asistencia lingüística. Maegan al [phone].    We comply with applicable federal civil rights laws and [place] laws. We do not discriminate on the basis of race, color, national origin, age, disability, sex, sexual orientation, or gender identity.            Thank you!     Thank you for choosing  [facility]  for your care. Our goal is always to provide you with excellent care. Hearing back from our patients is one way we can continue to improve our services. Please take a few minutes to complete the written survey that you may receive in the mail after your visit with us. Thank you!             Your Updated Medication List - Protect others around you: Learn how to safely use, store and throw away your medicines at www.disposemymeds.org.          This list is accurate as of 10/22/18  1:48 PM.  Always use your most recent med list.                   Brand Name Dispense Instructions for use Diagnosis    amoxicillin 875 MG tablet    AMOXIL    20 tablet     Take 1 tablet (875 mg) by mouth 2 times daily        guaiFENesin-codeine 100-10 MG/5ML Soln solution    ROBITUSSIN AC    180 mL    Take 5-10 mLs by mouth every 4 hours as needed        ibuprofen 800 MG tablet    ADVIL/MOTRIN    40 tablet    Take 1 tablet (800 mg) by mouth every 8 hours as needed for pain    Status post  delivery       predniSONE 20 MG tablet    DELTASONE    5 tablet    Take 1 tablet (20 mg) by mouth daily        VALIUM PO      Take by mouth as needed for anxiety

## 2018-11-27 ENCOUNTER — HOSPITAL ENCOUNTER (EMERGENCY)
Facility: HOSPITAL | Age: 28
Discharge: HOME | End: 2018-11-27
Attending: PHYSICIAN ASSISTANT | Admitting: PHYSICIAN ASSISTANT
Payer: COMMERCIAL

## 2018-11-27 VITALS
OXYGEN SATURATION: 96 % | TEMPERATURE: 96 F | RESPIRATION RATE: 14 BRPM | SYSTOLIC BLOOD PRESSURE: 125 MMHG | DIASTOLIC BLOOD PRESSURE: 86 MMHG

## 2018-11-27 DIAGNOSIS — M54.42 ACUTE LEFT-SIDED LOW BACK PAIN WITH LEFT-SIDED SCIATICA: ICD-10-CM

## 2018-11-27 PROCEDURE — 99213 OFFICE O/P EST LOW 20 MIN: CPT | Performed by: PHYSICIAN ASSISTANT

## 2018-11-27 PROCEDURE — 96372 THER/PROPH/DIAG INJ SC/IM: CPT

## 2018-11-27 PROCEDURE — 25000128 H RX IP 250 OP 636: Performed by: PHYSICIAN ASSISTANT

## 2018-11-27 PROCEDURE — G0463 HOSPITAL OUTPT CLINIC VISIT: HCPCS | Mod: 25

## 2018-11-27 RX ORDER — DIAZEPAM 10 MG/2ML
10 INJECTION, SOLUTION INTRAMUSCULAR; INTRAVENOUS ONCE
Status: COMPLETED | OUTPATIENT
Start: 2018-11-27 | End: 2018-11-27

## 2018-11-27 RX ORDER — KETOROLAC TROMETHAMINE 30 MG/ML
60 INJECTION, SOLUTION INTRAMUSCULAR; INTRAVENOUS ONCE
Status: COMPLETED | OUTPATIENT
Start: 2018-11-27 | End: 2018-11-27

## 2018-11-27 RX ORDER — PREDNISONE 50 MG/1
50 TABLET ORAL DAILY
Qty: 5 TABLET | Refills: 0 | Status: SHIPPED | OUTPATIENT
Start: 2018-11-27 | End: 2018-12-02

## 2018-11-27 RX ORDER — DIAZEPAM 10 MG
10 TABLET ORAL EVERY 6 HOURS PRN
Qty: 15 TABLET | Refills: 0 | Status: SHIPPED | OUTPATIENT
Start: 2018-11-27 | End: 2018-12-04

## 2018-11-27 RX ADMIN — KETOROLAC TROMETHAMINE 60 MG: 30 INJECTION, SOLUTION INTRAMUSCULAR at 21:00

## 2018-11-27 RX ADMIN — DIAZEPAM 10 MG: 5 INJECTION, SOLUTION INTRAMUSCULAR; INTRAVENOUS at 21:01

## 2018-11-27 ASSESSMENT — ENCOUNTER SYMPTOMS
CHILLS: 0
BACK PAIN: 1
FEVER: 0
GASTROINTESTINAL NEGATIVE: 1

## 2018-11-27 NOTE — ED AVS SNAPSHOT
[facility]    [address] [address]    Phone:  [phone]                                       Woody Clark   MRN: [id number]    [facility]   Date of Visit:  11/27/2018           After Visit Summary Signature Page     I have received my discharge instructions, and my questions have been answered. I have discussed any challenges I see with this plan with the nurse or doctor.    ..........................................................................................................................................  Patient/Patient Representative Signature      ..........................................................................................................................................  Patient Representative Print Name and Relationship to Patient    ..................................................               ................................................  Date                                   Time    ..........................................................................................................................................  Reviewed by Signature/Title    ...................................................              ..............................................  Date                                               Time          22EPIC Rev 08/18

## 2018-11-27 NOTE — ED AVS SNAPSHOT
[facility]    [address] [address]    Phone:  [phone]                                       Woody Clark   MRN: [id number]    Department:  [facility]   Date of Visit:  11/27/2018           Patient Information     Date Of Birth          1990        Your diagnoses for this visit were:     Acute left-sided low back pain with left-sided sciatica        You were seen by Manisha Dolan PA-C.      Follow-up Information     Follow up with Annette Ervin MD.    Specialty:  Family Practice    Why:  As needed    Contact information:    [facility]  [address] [facility] [address] [phone]          Follow up with [facility].    Specialty:  EMERGENCY MEDICINE    Why:  If symptoms worsen    Contact information:    [address] [address] [phone]    Additional information:    From [place]: Take [address]. Turn left at [address]. Turn left at the first stoplight on [address]. At the first stop sign, take a right onto [address]. Take a left into the parking lot and continue through until you reach the North enterance of the building.       From [place]: Take [address]. Take the [address] ramp towards [place]. Turn left onto [address]. Take a slight right onto [address]. Turn left at the first stoplight on [address]. At the first stop sign, take a right onto [address]. Take a left into the parking lot and continue through until you reach the North enterance of the building.       From [place]: Take [address]. Take a right at [address]. At the first stop sign, take a right onto [address]. Take a left into the parking lot and continue through until you reach the North enterance of the building.         Discharge Instructions       Diazepam and Prednisone for pain.    Follow-up with  chiropractor.    Return here for any worsening symptoms, urinary or bowel incontinence, or other concerns.      Discharge References/Attachments     LUMBAR RADICULOPATHY, UNDERSTANDING (ENGLISH)         Review of your medicines      START taking        Dose / Directions Last dose taken    diazepam 10 MG tablet   Commonly known as:  VALIUM   Dose:  10 mg   Quantity:  15 tablet        Take 1 tablet (10 mg) by mouth every 6 hours as needed for muscle spasms or pain (MUSCLE SPASM)   Refills:  0        oxyCODONE 5 mg   Commonly known as:  ROXICODONE   Dose:  5 mg   Quantity:  6 tablet        Take 1 tablet (5 mg) by mouth every 4 hours as needed for moderate to severe pain   Refills:  0        predniSONE 50 MG tablet   Commonly known as:  DELTASONE   Dose:  50 mg   Quantity:  5 tablet        Take 1 tablet (50 mg) by mouth daily for 5 days   Refills:  0          Our records show that you are taking the medicines listed below. If these are incorrect, please call your family doctor or clinic.        Dose / Directions Last dose taken    ibuprofen 800 MG tablet   Commonly known as:  ADVIL/MOTRIN   Dose:  800 mg   Quantity:  40 tablet        Take 1 tablet (800 mg) by mouth every 8 hours as needed for pain   Refills:  1                Information about OPIOIDS     PRESCRIPTION OPIOIDS: WHAT YOU NEED TO KNOW   We gave you an opioid (narcotic) pain medicine. It is important to manage your pain, but opioids are not always the best choice. You should first try all the other options your care team gave you. Take this medicine for as short a time (and as few doses) as possible.    Some activities can increase your pain, such as bandage changes or therapy sessions. It may help to take your pain medicine 30 to 60 minutes before these activities. Reduce your stress by getting enough sleep, working on hobbies you enjoy and practicing relaxation or meditation. Talk to your care team about ways to manage your pain beyond prescription  opioids.    These medicines have risks:    DO NOT drive when on new or higher doses of pain medicine. These medicines can affect your alertness and reaction times, and you could be arrested for driving under the influence (DUI). If you need to use opioids long-term, talk to your care team about driving.    DO NOT operate heavy machinery    DO NOT do any other dangerous activities while taking these medicines.    DO NOT drink any alcohol while taking these medicines.     If the opioid prescribed includes acetaminophen, DO NOT take with any other medicines that contain acetaminophen. Read all labels carefully. Look for the word  acetaminophen  or  Tylenol.  Ask your pharmacist if you have questions or are unsure.    You can get addicted to pain medicines, especially if you have a history of addiction (chemical, alcohol or substance dependence). Talk to your care team about ways to reduce this risk.    All opioids tend to cause constipation. Drink plenty of water and eat foods that have a lot of fiber, such as fruits, vegetables, prune juice, apple juice and high-fiber cereal. Take a laxative (Miralax, milk of magnesia, Colace, Senna) if you don t move your bowels at least every other day. Other side effects include upset stomach, sleepiness, dizziness, throwing up, tolerance (needing more of the medicine to have the same effect), physical dependence and slowed breathing.    Store your pills in a secure place, locked if possible. We will not replace any lost or stolen medicine. If you don t finish your medicine, please throw away (dispose) as directed by your pharmacist. The [place] Pollution [employer] has more information about safe disposal: https://www.pca.[facility].mn.us/living-green/managing-unwanted-medications        Prescriptions were sent or printed at these locations (3 Prescriptions)                   [facility] [address] [place] [address] - [address] AT [facility] OF  [address],  "[place] [address]    Telephone:  [phone]   Fax:  [phone]   Hours:                  E-Prescribed (1 of 3)         predniSONE (DELTASONE) 50 MG tablet                 Printed at Department/Unit printer (2 of 3)         diazepam (VALIUM) 10 MG tablet               oxyCODONE (ROXICODONE) 5 mg                Orders Needing Specimen Collection     None      Pending Results     No orders found from 2018 to 2018.            Pending Culture Results     No orders found from 2018 to 2018.            Thank you for choosing [facility]       Thank you for choosing [facility] for your care. Our goal is always to provide you with excellent care. Hearing back from our patients is one way we can continue to improve our services. Please take a few minutes to complete the written survey that you may receive in the mail after you visit with us. Thank you!        [employer] Information     [employer] lets you send messages to your doctor, view your test results, renew your prescriptions, schedule appointments and more. To sign up, go to [email or web address] . Click on \"Log in\" on the left side of the screen, which will take you to the Welcome page. Then click on \"Sign up Now\" on the right side of the page.     You will be asked to enter the access code listed below, as well as some personal information. Please follow the directions to create your username and password.     Your access code is: [email or web address]  Expires: 2019  5:07 PM     Your access code will  in 90 days. If you need help or a new code, please call your [facility] clinic or [phone].        Care EveryWhere ID     This is your Care EveryWhere ID. This could be used by other organizations to access your [facility] medical records  [id number]        Equal Access to Services     ISMA PABLO : Nat Cabello, enedelia leung, mariela gomez. So [facility] " [phone].    ATENCIÓN: Si habla español, tiene a poe disposición servicios gratuitos de asistencia lingüística. Llame al [phone].    We comply with applicable federal civil rights laws and [place] laws. We do not discriminate on the basis of race, color, national origin, age, disability, sex, sexual orientation, or gender identity.            After Visit Summary       This is your record. Keep this with you and show to your community pharmacist(s) and doctor(s) at your next visit.

## 2018-11-28 ENCOUNTER — OFFICE VISIT (OUTPATIENT)
Dept: CHIROPRACTIC MEDICINE | Facility: OTHER | Age: 28
End: 2018-11-28
Attending: CHIROPRACTOR
Payer: COMMERCIAL

## 2018-11-28 DIAGNOSIS — M99.03 SEGMENTAL AND SOMATIC DYSFUNCTION OF LUMBAR REGION: Primary | ICD-10-CM

## 2018-11-28 DIAGNOSIS — M99.02 SEGMENTAL AND SOMATIC DYSFUNCTION OF THORACIC REGION: ICD-10-CM

## 2018-11-28 DIAGNOSIS — M54.50 ACUTE BILATERAL LOW BACK PAIN WITHOUT SCIATICA: ICD-10-CM

## 2018-11-28 PROCEDURE — 98940 CHIROPRACT MANJ 1-2 REGIONS: CPT | Mod: AT | Performed by: CHIROPRACTOR

## 2018-11-28 NOTE — ED PROVIDER NOTES
History     Chief Complaint   Patient presents with     Back Pain     c/o chronic lower back notes worse since this am     The history is provided by the patient.     Woody Clark is a 28 year old female who presented to the urgent care ambulatory with a slight limp for evaluation of left lower back pain with radiation to the left thigh.  The patient tells me that she was getting some lunch is ready this morning for her family when she bent down and upon standing she felt an abrupt onset of low back pain with radiation to left thigh.  Denies any falls.  Denies any trauma.  Denies any fevers.  Denies any flank pain.  Denies any lower urinary tract symptoms.  Denies any history of cancer or IV drug abuse.  Denies any bowel or bladder dysfunction.  Denies any immunosuppression.    Problem List:    Patient Active Problem List    Diagnosis Date Noted     Vomiting, persistent, in adult 2016     Priority: Medium     Acute pyelonephritis 2016     Priority: Medium     Persistent vomiting in adult patient 2016     Priority: Medium     Elevated liver enzymes 2016     Priority: Medium     Status post  delivery 2013     Priority: Medium     Diagnosis updated by automated process. Provider to review and confirm.       Status post  delivery 2013     Priority: Medium     Obesity 2013     Priority: Medium     SVT (supraventricular tachycardia) (H) 2013     Priority: Medium     Drug use 1st trimester 2013     Priority: Medium        Past Medical History:    Past Medical History:   Diagnosis Date     Asthma      Seasonal allergies      SVT (supraventricular tachycardia) (H)        Past Surgical History:    Past Surgical History:   Procedure Laterality Date      SECTION  2013    Procedure:  SECTION;;  Surgeon: Mally Danielson MD;  Location: [facility]     TONSILLECTOMY         Family History:    Family History   Problem Relation Age of Onset      Diabetes Sister      Type 1     Diabetes Paternal Grandmother      Type 2     Thyroid Disease Mother      Hypothyroidism     HEART DISEASE Maternal Grandmother      Pacemaker     Thyroid Disease Maternal Grandmother      Thyroid Disorder       Social History:  Marital Status:   [2]  Social History   Substance Use Topics     Smoking status: Former Smoker     Smokeless tobacco: Never Used     Alcohol use No        Medications:      diazepam (VALIUM) 10 MG tablet   ibuprofen (ADVIL,MOTRIN) 800 MG tablet   oxyCODONE (ROXICODONE) 5 mg   predniSONE (DELTASONE) 50 MG tablet         Review of Systems   Constitutional: Negative for chills and fever.   Gastrointestinal: Negative.    Genitourinary: Negative.    Musculoskeletal: Positive for back pain.   Skin: Negative.        Physical Exam   BP: 125/86  Heart Rate: 95  Temp: 96  F (35.6  C)  Resp: 14  SpO2: 96 %      Physical Exam   Constitutional: She is oriented to person, place, and time. She appears well-developed and well-nourished.   Standing in the exam room.   Musculoskeletal:   Examination was difficult due to pain.  She does have focal tenderness upon palpation of the left paravertebral area approximately L4-L5.  She is able to ambulate with a mild limp.  She is not dragging the leg.   Neurological: She is alert and oriented to person, place, and time.   Skin: Skin is warm and dry.   Psychiatric: She has a normal mood and affect.   Nursing note and vitals reviewed.      ED Course     ED Course     Procedures               Critical Care time:  none               No results found for this or any previous visit (from the past 24 hour(s)).    Medications   ketorolac (TORADOL) injection 60 mg (60 mg Intramuscular Given 11/27/18 2100)   diazepam (VALIUM) injection 10 mg (10 mg Intramuscular Given 11/27/18 2101)       Assessments & Plan (with Medical Decision Making)   History and symptoms are most consistent with acute lumbar herniation with radiculopathy.  There is  no red flag signs or symptoms.  No bowel or bladder dysfunction.  No history of cancer or IV drug abuse.  Treated with IM Toradol and Valium.  Home with a small amount of pain medications and steroids.  Follow-up with chiropractor.  Return here for any worsening symptoms or other concerns.    This document was prepared using a combination of typing and voice generated software.  While every attempt was made for accuracy, spelling and grammatical errors may exist.    I have reviewed the nursing notes.    I have reviewed the findings, diagnosis, plan and need for follow up with the patient.       New Prescriptions    DIAZEPAM (VALIUM) 10 MG TABLET    Take 1 tablet (10 mg) by mouth every 6 hours as needed for muscle spasms or pain (MUSCLE SPASM)    OXYCODONE (ROXICODONE) 5 MG    Take 1 tablet (5 mg) by mouth every 4 hours as needed for moderate to severe pain    PREDNISONE (DELTASONE) 50 MG TABLET    Take 1 tablet (50 mg) by mouth daily for 5 days       Final diagnoses:   Acute left-sided low back pain with left-sided sciatica       11/27/2018   [facility]     Manisha Dolan PA-C  11/27/18 6580

## 2018-11-28 NOTE — DISCHARGE INSTRUCTIONS
Diazepam and Prednisone for pain.    Follow-up with chiropractor.    Return here for any worsening symptoms, urinary or bowel incontinence, or other concerns.

## 2018-11-28 NOTE — MR AVS SNAPSHOT
"              After Visit Summary   11/28/2018    Woody Clark    MRN: [id number]           Patient Information     Date Of Birth          1990        Visit Information        Provider Department      11/28/2018 2:40 PM Isaac Trejo DC  [facility]        Today's Diagnoses     Segmental and somatic dysfunction of lumbar region    -  1    Acute bilateral low back pain without sciatica        Segmental and somatic dysfunction of thoracic region           Follow-ups after your visit        Your next 10 appointments already scheduled     Nov 30, 2018  8:10 AM CST   Return Visit with Isaac Trejo DC   [facility]    [address]  [facility] [address]   [phone]              Who to contact     If you have questions or need follow up information about today's clinic visit or your schedule please contact  [facility] directly at [phone].  Normal or non-critical lab and imaging results will be communicated to you by [email or web address], letter or phone within 4 business days after the clinic has received the results. If you do not hear from us within 7 days, please contact the clinic through [email or web address] or phone. If you have a critical or abnormal lab result, we will notify you by phone as soon as possible.  Submit refill requests through [employer] or call your pharmacy and they will forward the refill request to us. Please allow 3 business days for your refill to be completed.          Additional Information About Your Visit        MyChart Information     [employer] lets you send messages to your doctor, view your test results, renew your prescriptions, schedule appointments and more. To sign up, go to [email or web address] . Click on \"Log in\" on the left side of the screen, which will take you to the Welcome page. Then click on \"Sign up Now\" on the right side of the page.     You will be asked to enter the access code listed below, as well as some personal " information. Please follow the directions to create your username and password.     Your access code is: [email or web address]  Expires: 2019  5:07 PM     Your access code will  in 90 days. If you need help or a new code, please call your [facility] clinic or [phone].        Care EveryWhere ID     This is your Care EveryWhere ID. This could be used by other organizations to access your [facility] medical records  [id number]         Blood Pressure from Last 3 Encounters:   18 125/86   10/03/18 127/80   17 139/79    Weight from Last 3 Encounters:   16 166 lb 0.1 oz (75.3 kg)   13 220 lb (99.8 kg)   13 242 lb (109.8 kg)              We Performed the Following     CHIROPRAC MANIP,SPINAL,1-2 REGIONS        Primary Care Provider Office Phone # Fax #    Annette Ervin [phone]       [facility] [address] [place] [address]        Equal Access to Services     [facility]: Hadii aad ku hadasho Soomaali, waaxda luqadaha, qaybta kaalmada edy, mariela vang . So [facility] [phone].    ATENCIÓN: Si habla español, tiene a poe disposición servicios gratuitos de asistencia lingüística. Maegan al [phone].    We comply with applicable federal civil rights laws and [place] laws. We do not discriminate on the basis of race, color, national origin, age, disability, sex, sexual orientation, or gender identity.            Thank you!     Thank you for choosing  [facility]  for your care. Our goal is always to provide you with excellent care. Hearing back from our patients is one way we can continue to improve our services. Please take a few minutes to complete the written survey that you may receive in the mail after your visit with us. Thank you!             Your Updated Medication List - Protect others around you: Learn how to safely use, store and throw away your medicines at www.disposemymeds.org.           This list is accurate as of 18 11:59 PM.  Always use your most recent med list.                   Brand Name Dispense Instructions for use Diagnosis    diazepam 10 MG tablet    VALIUM    15 tablet    Take 1 tablet (10 mg) by mouth every 6 hours as needed for muscle spasms or pain (MUSCLE SPASM)        ibuprofen 800 MG tablet    ADVIL/MOTRIN    40 tablet    Take 1 tablet (800 mg) by mouth every 8 hours as needed for pain    Status post  delivery       oxyCODONE 5 mg    ROXICODONE    6 tablet    Take 1 tablet (5 mg) by mouth every 4 hours as needed for moderate to severe pain        predniSONE 50 MG tablet    DELTASONE    5 tablet    Take 1 tablet (50 mg) by mouth daily for 5 days

## 2018-11-28 NOTE — ED TRIAGE NOTES
Pt presents with left lower back pain since this morning. States she was bending over this morning making lunches and when she stood back up she had sharp back and leg pain. Rates pain at 10/10 and describes it as constant sharp pains. Took ibuprofen at 1600 today.

## 2018-11-29 NOTE — PROGRESS NOTES
1    Subjective Finding:    Chief compalint: Patient presents with:  Back Pain: significant pain  , Pain Scale: 8/10, Intensity: sharp, Duration: 2 days, Radiating: no.    Date of injury:     Activities that the pain restricts:   Home/household/hobbies/social activities: yes.  Work duties: no.  Sleep: no.  Makes symptoms better: rest.  Makes symptoms worse: lumbar flexion.  Have you seen anyone else for the symptoms? No.  Work related: no.  Automobile related injury: no.    Objective and Assessment:    Posture Analysis:   High shoulder: .  Head tilt: .  High iliac crest: .  Head carriage: neutral.  Thoracic Kyphosis: neutral.  Lumbar Lordosis: forward.    Lumbar Range of Motion: extension decreased.  Cervical Range of Motion: .  Thoracic Range of Motion: extension decreased.  Extremity Range of Motion: .    Palpation:   Quad lumb: bilateral, referred pain: no  T paraspinals: dull pain, no    Segmental dysfunction pre-treatment and treatment area: T67  L45.    Assessment post-treatment:  Cervical: ROM increased.  Thoracic: ROM increased.  Lumbar: ROM increased.    Comments: .      Complicating Factors: .    Procedure(s):  [facility]:  [address] Chiropractic manipulative treatment 3-4 regions performed   Cervical: Diversified, See above for level, Supine, Thoracic: Diversified, See above for level, Prone and Lumbar: Diversified, See above for level, Side posture    Modalities:  None performed this visit    Therapeutic procedures:  None    Plan:  Treatment plan: PRN.  Instructed patient: ice 20 minutes every other hour as needed.  Short term goals: increase ROM.  Long term goals: increase ADL.  Prognosis: excellent.

## 2018-11-30 ENCOUNTER — OFFICE VISIT (OUTPATIENT)
Dept: CHIROPRACTIC MEDICINE | Facility: OTHER | Age: 28
End: 2018-11-30
Attending: CHIROPRACTOR
Payer: COMMERCIAL

## 2018-11-30 DIAGNOSIS — M99.03 SEGMENTAL AND SOMATIC DYSFUNCTION OF LUMBAR REGION: Primary | ICD-10-CM

## 2018-11-30 DIAGNOSIS — M54.50 ACUTE BILATERAL LOW BACK PAIN WITHOUT SCIATICA: ICD-10-CM

## 2018-11-30 DIAGNOSIS — M99.02 SEGMENTAL AND SOMATIC DYSFUNCTION OF THORACIC REGION: ICD-10-CM

## 2018-11-30 PROCEDURE — 98940 CHIROPRACT MANJ 1-2 REGIONS: CPT | Mod: AT | Performed by: CHIROPRACTOR

## 2018-11-30 NOTE — MR AVS SNAPSHOT
"              After Visit Summary   11/30/2018    Woody Clark    MRN: [id number]           Patient Information     Date Of Birth          1990        Visit Information        Provider Department      11/30/2018 8:10 AM Isaac Trejo DC [facility]        Today's Diagnoses     Segmental and somatic dysfunction of lumbar region    -  1    Acute bilateral low back pain without sciatica        Segmental and somatic dysfunction of thoracic region           Follow-ups after your visit        Your next 10 appointments already scheduled     Dec 03, 2018  9:00 AM CST   Return Visit with Isaac Trejo DC   [facility] ([facility])    [address]  [facility] [address]   [phone]              Who to contact     If you have questions or need follow up information about today's clinic visit or your schedule please contact  [facility] directly at [phone].  Normal or non-critical lab and imaging results will be communicated to you by Smartjoghart, letter or phone within 4 business days after the clinic has received the results. If you do not hear from us within 7 days, please contact the clinic through Smartjoghart or phone. If you have a critical or abnormal lab result, we will notify you by phone as soon as possible.  Submit refill requests through [employer] or call your pharmacy and they will forward the refill request to us. Please allow 3 business days for your refill to be completed.          Additional Information About Your Visit        MyChart Information     [employer] lets you send messages to your doctor, view your test results, renew your prescriptions, schedule appointments and more. To sign up, go to [email or web address] . Click on \"Log in\" on the left side of the screen, which will take you to the Welcome page. Then click on \"Sign up Now\" on the right side of the page.     You will be asked to enter the access code listed below, as well as some personal " information. Please follow the directions to create your username and password.     Your access code is: [email or web address]  Expires: 2019  5:07 PM     Your access code will  in 90 days. If you need help or a new code, please call your [place] clinic or [phone].        Care EveryWhere ID     This is your Care EveryWhere ID. This could be used by other organizations to access your [place] medical records  [id number]         Blood Pressure from Last 3 Encounters:   18 125/86   10/03/18 127/80   17 139/79    Weight from Last 3 Encounters:   16 166 lb 0.1 oz (75.3 kg)   13 220 lb (99.8 kg)   13 242 lb (109.8 kg)              We Performed the Following     CHIROPRAC MANIP,SPINAL,1-2 REGIONS        Primary Care Provider Office Phone # Fax #    Annette Ervin [phone]       [facility] [address] [facility] [address]        Equal Access to Services     [facility]: Hadii aad ku hadasho Soomaali, waaxda luqadaha, qaybta kaalmada edy, mariela vang . So [facility] [phone].    ATENCIÓN: Si habla español, tiene a poe disposición servicios gratuitos de asistencia lingüística. Maegan al [phone].    We comply with applicable federal civil rights laws and [place] laws. We do not discriminate on the basis of race, color, national origin, age, disability, sex, sexual orientation, or gender identity.            Thank you!     Thank you for choosing  [facility]  for your care. Our goal is always to provide you with excellent care. Hearing back from our patients is one way we can continue to improve our services. Please take a few minutes to complete the written survey that you may receive in the mail after your visit with us. Thank you!             Your Updated Medication List - Protect others around you: Learn how to safely use, store and throw away your medicines at www.disposemymeds.org.           This list is accurate as of 18 11:59 PM.  Always use your most recent med list.                   Brand Name Dispense Instructions for use Diagnosis    diazepam 10 MG tablet    VALIUM    15 tablet    Take 1 tablet (10 mg) by mouth every 6 hours as needed for muscle spasms or pain (MUSCLE SPASM)        ibuprofen 800 MG tablet    ADVIL/MOTRIN    40 tablet    Take 1 tablet (800 mg) by mouth every 8 hours as needed for pain    Status post  delivery       oxyCODONE 5 mg    ROXICODONE    6 tablet    Take 1 tablet (5 mg) by mouth every 4 hours as needed for moderate to severe pain        predniSONE 50 MG tablet    DELTASONE    5 tablet    Take 1 tablet (50 mg) by mouth daily for 5 days

## 2018-12-03 ENCOUNTER — OFFICE VISIT (OUTPATIENT)
Dept: CHIROPRACTIC MEDICINE | Facility: OTHER | Age: 28
End: 2018-12-03
Attending: CHIROPRACTOR
Payer: COMMERCIAL

## 2018-12-03 DIAGNOSIS — M99.02 SEGMENTAL AND SOMATIC DYSFUNCTION OF THORACIC REGION: ICD-10-CM

## 2018-12-03 DIAGNOSIS — M99.01 SEGMENTAL AND SOMATIC DYSFUNCTION OF CERVICAL REGION: ICD-10-CM

## 2018-12-03 DIAGNOSIS — M99.03 SEGMENTAL AND SOMATIC DYSFUNCTION OF LUMBAR REGION: Primary | ICD-10-CM

## 2018-12-03 DIAGNOSIS — M54.50 ACUTE BILATERAL LOW BACK PAIN WITHOUT SCIATICA: ICD-10-CM

## 2018-12-03 PROCEDURE — 98941 CHIROPRACT MANJ 3-4 REGIONS: CPT | Mod: AT | Performed by: CHIROPRACTOR

## 2018-12-03 NOTE — MR AVS SNAPSHOT
"              After Visit Summary   12/3/2018    Woody Clark    MRN: [id number]           Patient Information     Date Of Birth          1990        Visit Information        Provider Department      12/3/2018 9:00 AM [facility]        Today's Diagnoses     Segmental and somatic dysfunction of lumbar region    -  1    Acute bilateral low back pain without sciatica        Segmental and somatic dysfunction of thoracic region        Segmental and somatic dysfunction of cervical region           Follow-ups after your visit        Who to contact     If you have questions or need follow up information about today's clinic visit or your schedule please contact  [facility] directly at [phone].  Normal or non-critical lab and imaging results will be communicated to you by WineDemonhart, letter or phone within 4 business days after the clinic has received the results. If you do not hear from us within 7 days, please contact the clinic through WineDemonhart or phone. If you have a critical or abnormal lab result, we will notify you by phone as soon as possible.  Submit refill requests through [employer] or call your pharmacy and they will forward the refill request to us. Please allow 3 business days for your refill to be completed.          Additional Information About Your Visit        MyChart Information     [employer] lets you send messages to your doctor, view your test results, renew your prescriptions, schedule appointments and more. To sign up, go to [email or web address] . Click on \"Log in\" on the left side of the screen, which will take you to the Welcome page. Then click on \"Sign up Now\" on the right side of the page.     You will be asked to enter the access code listed below, as well as some personal information. Please follow the directions to create your username and password.     Your access code is: [email or web address]  Expires: 1/1/2019  5:07 PM     Your access code will "  in 90 days. If you need help or a new code, please call your [place] clinic or [phone].        Care EveryWhere ID     This is your Care EveryWhere ID. This could be used by other organizations to access your [place] medical records  [id number]         Blood Pressure from Last 3 Encounters:   18 125/86   10/03/18 127/80   17 139/79    Weight from Last 3 Encounters:   16 166 lb 0.1 oz (75.3 kg)   13 220 lb (99.8 kg)   13 242 lb (109.8 kg)              We Performed the Following     CHIROPRAC MANIP,SPINAL,3-4 REGIONS        Primary Care Provider Office Phone # Fax #    Annette Ervin [phone]       [facility] [address] [facility] [address]        Equal Access to Services     ISMA PABLO : Hadii aad ku hadasho Soomaali, waaxda luqadaha, qaybta kaalmada adeegyada, waxay idiin hayfredin jr vang . So [facility] [phone].    ATENCIÓN: Si habla español, tiene a poe disposición servicios gratuitos de asistencia lingüística. Maegan al [phone].    We comply with applicable federal civil rights laws and [place] laws. We do not discriminate on the basis of race, color, national origin, age, disability, sex, sexual orientation, or gender identity.            Thank you!     Thank you for choosing  [facility]  for your care. Our goal is always to provide you with excellent care. Hearing back from our patients is one way we can continue to improve our services. Please take a few minutes to complete the written survey that you may receive in the mail after your visit with us. Thank you!             Your Updated Medication List - Protect others around you: Learn how to safely use, store and throw away your medicines at www.disposemymeds.org.          This list is accurate as of 12/3/18 11:59 PM.  Always use your most recent med list.                   Brand Name Dispense Instructions for use Diagnosis    diazepam 10 MG  tablet    VALIUM    15 tablet    Take 1 tablet (10 mg) by mouth every 6 hours as needed for muscle spasms or pain (MUSCLE SPASM)        ibuprofen 800 MG tablet    ADVIL/MOTRIN    40 tablet    Take 1 tablet (800 mg) by mouth every 8 hours as needed for pain    Status post  delivery       oxyCODONE 5 mg    ROXICODONE    6 tablet    Take 1 tablet (5 mg) by mouth every 4 hours as needed for moderate to severe pain

## 2018-12-03 NOTE — PROGRESS NOTES
1    Subjective Finding:    Chief compalint: Patient presents with:  Back Pain  , Pain Scale: 5/10, Intensity: sharp, Duration: 5 days, Radiating: no.    Date of injury:     Activities that the pain restricts:   Home/household/hobbies/social activities: yes.  Work duties: no.  Sleep: no.  Makes symptoms better: rest.  Makes symptoms worse: lumbar flexion.  Have you seen anyone else for the symptoms? No.  Work related: no.  Automobile related injury: no.    Objective and Assessment:    Posture Analysis:   High shoulder: .  Head tilt: .  High iliac crest: .  Head carriage: neutral.  Thoracic Kyphosis: neutral.  Lumbar Lordosis: forward.    Lumbar Range of Motion: extension decreased.  Cervical Range of Motion: .  Thoracic Range of Motion: extension decreased.  Extremity Range of Motion: .    Palpation:   Quad lumb: bilateral, referred pain: no  T paraspinals: dull pain, no    Segmental dysfunction pre-treatment and treatment area: T67  L45.    Assessment post-treatment:  Cervical: ROM increased.  Thoracic: ROM increased.  Lumbar: ROM increased.    Comments: .      Complicating Factors: .    Procedure(s):  CMT:  14232 Chiropractic manipulative treatment 3-4 regions performed   Cervical: Diversified, See above for level, Supine, Thoracic: Diversified, See above for level, Prone and Lumbar: Diversified, See above for level, Side posture    Modalities:  None performed this visit    Therapeutic procedures:  None    Plan:  Treatment plan: PRN.  Instructed patient: ice 20 minutes every other hour as needed.  Short term goals: increase ROM.  Long term goals: increase ADL.  Prognosis: excellent.

## 2018-12-10 ENCOUNTER — OFFICE VISIT (OUTPATIENT)
Dept: CHIROPRACTIC MEDICINE | Facility: OTHER | Age: 28
End: 2018-12-10
Attending: CHIROPRACTOR
Payer: COMMERCIAL

## 2018-12-10 DIAGNOSIS — M54.50 ACUTE BILATERAL LOW BACK PAIN WITHOUT SCIATICA: ICD-10-CM

## 2018-12-10 DIAGNOSIS — M99.02 SEGMENTAL AND SOMATIC DYSFUNCTION OF THORACIC REGION: ICD-10-CM

## 2018-12-10 DIAGNOSIS — M99.03 SEGMENTAL AND SOMATIC DYSFUNCTION OF LUMBAR REGION: Primary | ICD-10-CM

## 2018-12-10 PROCEDURE — 98941 CHIROPRACT MANJ 3-4 REGIONS: CPT | Mod: AT | Performed by: CHIROPRACTOR

## 2018-12-11 NOTE — PROGRESS NOTES
1    Subjective Finding:    Chief compalint: Patient presents with:  Back Pain: getting better with treatment.  still sharp pain in low back  , Pain Scale: 5/10, Intensity: sharp, Duration: 5 days, Radiating: no.    Date of injury:     Activities that the pain restricts:   Home/household/hobbies/social activities: yes.  Work duties: no.  Sleep: no.  Makes symptoms better: rest.  Makes symptoms worse: lumbar flexion.  Have you seen anyone else for the symptoms? No.  Work related: no.  Automobile related injury: no.    Objective and Assessment:    Posture Analysis:   High shoulder: .  Head tilt: .  High iliac crest: .  Head carriage: neutral.  Thoracic Kyphosis: neutral.  Lumbar Lordosis: forward.    Lumbar Range of Motion: extension decreased.  Cervical Range of Motion: .  Thoracic Range of Motion: extension decreased.  Extremity Range of Motion: .    Palpation:   Quad lumb: bilateral, referred pain: no  T paraspinals: dull pain, no    Segmental dysfunction pre-treatment and treatment area: T67  L45.  C6    Assessment post-treatment:  Cervical: ROM increased.  Thoracic: ROM increased.  Lumbar: ROM increased.    Comments: .      Complicating Factors: .    Procedure(s):  CMT:  94167 Chiropractic manipulative treatment 3-4 regions performed   Cervical: Diversified, See above for level, Supine, Thoracic: Diversified, See above for level, Prone and Lumbar: Diversified, See above for level, Side posture    Modalities:  None performed this visit    Therapeutic procedures:  None    Plan:  Treatment plan: PRN.  Instructed patient: ice 20 minutes every other hour as needed.  Short term goals: increase ROM.  Long term goals: increase ADL.  Prognosis: excellent.

## 2018-12-19 ENCOUNTER — ANCILLARY PROCEDURE (OUTPATIENT)
Dept: GENERAL RADIOLOGY | Facility: OTHER | Age: 28
End: 2018-12-19
Attending: CHIROPRACTOR
Payer: COMMERCIAL

## 2018-12-19 ENCOUNTER — OFFICE VISIT (OUTPATIENT)
Dept: CHIROPRACTIC MEDICINE | Facility: OTHER | Age: 28
End: 2018-12-19
Attending: CHIROPRACTOR
Payer: COMMERCIAL

## 2018-12-19 DIAGNOSIS — M54.50 ACUTE BILATERAL LOW BACK PAIN WITHOUT SCIATICA: ICD-10-CM

## 2018-12-19 DIAGNOSIS — M99.03 SEGMENTAL AND SOMATIC DYSFUNCTION OF LUMBAR REGION: Primary | ICD-10-CM

## 2018-12-19 DIAGNOSIS — M99.03 SEGMENTAL AND SOMATIC DYSFUNCTION OF LUMBAR REGION: ICD-10-CM

## 2018-12-19 DIAGNOSIS — M99.02 SEGMENTAL AND SOMATIC DYSFUNCTION OF THORACIC REGION: ICD-10-CM

## 2018-12-19 PROCEDURE — 72100 X-RAY EXAM L-S SPINE 2/3 VWS: CPT | Mod: TC

## 2018-12-19 PROCEDURE — 98940 CHIROPRACT MANJ 1-2 REGIONS: CPT | Mod: AT | Performed by: CHIROPRACTOR

## 2018-12-19 NOTE — PROGRESS NOTES
1    Subjective Finding:    Chief compalint: Patient presents with:  Back Pain: still complaining of intense low back pain  , Pain Scale: 5/10, Intensity: sharp, Duration: 4 weeks days, Radiating: no.    Date of injury:     Activities that the pain restricts:   Home/household/hobbies/social activities: yes.  Work duties: no.  Sleep: no.  Makes symptoms better: rest.  Makes symptoms worse: lumbar flexion.  Have you seen anyone else for the symptoms? No.  Work related: no.  Automobile related injury: no.    Objective and Assessment:    Posture Analysis:   High shoulder: .  Head tilt: .  High iliac crest: .  Head carriage: neutral.  Thoracic Kyphosis: neutral.  Lumbar Lordosis: forward.    Lumbar Range of Motion: extension decreased.  Cervical Range of Motion: .  Thoracic Range of Motion: extension decreased.  Extremity Range of Motion: .    Palpation:   Quad lumb: bilateral, referred pain: no  T paraspinals: dull pain, no    Segmental dysfunction pre-treatment and treatment area: T67  L45.      Assessment post-treatment:  Cervical: ROM increased.  Thoracic: ROM increased.  Lumbar: ROM increased.    Comments: .      Complicating Factors: .    Procedure(s):  [facility]:  24792 Chiropractic manipulative treatment 3-4 regions performed   Cervical: Diversified, See above for level, Supine, Thoracic: Diversified, See above for level, Prone and Lumbar: Diversified, See above for level, Side posture    Modalities:  None performed this visit    Therapeutic procedures:  None    Plan:  Treatment plan: PRN.  Instructed patient: ice 20 minutes every other hour as needed.  Short term goals: increase ROM.  Long term goals: increase ADL.  Prognosis: excellent.

## 2018-12-31 ENCOUNTER — HOSPITAL ENCOUNTER (OUTPATIENT)
Dept: MRI IMAGING | Facility: HOSPITAL | Age: 28
Discharge: HOME | End: 2018-12-31
Attending: CHIROPRACTOR | Admitting: CHIROPRACTOR
Payer: COMMERCIAL

## 2018-12-31 DIAGNOSIS — M99.03 SEGMENTAL AND SOMATIC DYSFUNCTION OF LUMBAR REGION: ICD-10-CM

## 2018-12-31 PROCEDURE — 72148 MRI LUMBAR SPINE W/O DYE: CPT | Mod: TC

## 2019-07-23 ENCOUNTER — HOSPITAL ENCOUNTER (OUTPATIENT)
Dept: MRI IMAGING | Facility: HOSPITAL | Age: 29
Discharge: HOME | End: 2019-07-23
Attending: SPECIALIST | Admitting: SPECIALIST
Payer: COMMERCIAL

## 2019-07-23 DIAGNOSIS — M54.2 CERVICALGIA: ICD-10-CM

## 2019-07-23 PROCEDURE — 72141 MRI NECK SPINE W/O DYE: CPT | Mod: TC

## 2019-09-20 ENCOUNTER — HOSPITAL ENCOUNTER (EMERGENCY)
Facility: HOSPITAL | Age: 29
Discharge: HOME | End: 2019-09-20
Attending: NURSE PRACTITIONER | Admitting: NURSE PRACTITIONER
Payer: COMMERCIAL

## 2019-09-20 VITALS
DIASTOLIC BLOOD PRESSURE: 74 MMHG | SYSTOLIC BLOOD PRESSURE: 127 MMHG | RESPIRATION RATE: 16 BRPM | OXYGEN SATURATION: 98 % | TEMPERATURE: 98.2 F

## 2019-09-20 DIAGNOSIS — H60.8X2 NONINFECTIOUS OTITIS EXTERNA OF LEFT EAR, UNSPECIFIED CHRONICITY, UNSPECIFIED TYPE: ICD-10-CM

## 2019-09-20 PROCEDURE — G0463 HOSPITAL OUTPT CLINIC VISIT: HCPCS

## 2019-09-20 PROCEDURE — 99213 OFFICE O/P EST LOW 20 MIN: CPT | Mod: Z6 | Performed by: NURSE PRACTITIONER

## 2019-09-20 RX ORDER — NEOMYCIN SULFATE, POLYMYXIN B SULFATE AND HYDROCORTISONE 10; 3.5; 1 MG/ML; MG/ML; [USP'U]/ML
3 SUSPENSION/ DROPS AURICULAR (OTIC) 4 TIMES DAILY
Qty: 5 ML | Refills: 0 | Status: SHIPPED | OUTPATIENT
Start: 2019-09-20 | End: 2019-09-27

## 2019-09-20 ASSESSMENT — ENCOUNTER SYMPTOMS
EYES NEGATIVE: 1
ACTIVITY CHANGE: 1
ABDOMINAL PAIN: 0
HEADACHES: 1
CHILLS: 0
COUGH: 0
NAUSEA: 1
DIARRHEA: 1
RHINORRHEA: 1
VOMITING: 0
SINUS PAIN: 0
SINUS PRESSURE: 0
FEVER: 0
SHORTNESS OF BREATH: 0

## 2019-09-20 NOTE — ED TRIAGE NOTES
Pt is here today with c/o left and right ear pain. ongoing 2-3 weeks. States has clear and yellow drainage. Notes this is the second time this has happened last time she was dx with swimmers ear and was given abx and thinks it hasn't went away. Pt states she is 7 week pregnant.

## 2019-09-20 NOTE — ED TRIAGE NOTES
Patient presents with complaints of bilateral ear pain.  States she was treated about a month ago for bilat infection and doesn't think it every went away; reports clear drainage from both ears.  Patient is 7 weeks pregnant.

## 2019-09-20 NOTE — ED PROVIDER NOTES
History     Chief Complaint   Patient presents with     Otalgia     HPI  Woody Clark is a 29 year old female who presents today with complaints of ear pain for ove one month. She was treated one month ago for swimmer's ears, but feels it never cleared up. Today, she continues to complains of bilateral ear pain, runny nose, headaches, and nausea. She is seven weeks pregnant. She does state she has yellow to clear drainage at times from her ears. The right ear hurts more then the left.Denies fevers, chills,  vomiting, and shortness of breath.      Allergies:  Allergies   Allergen Reactions     Ciprofloxacin      Latex      Seasonal Allergies        Problem List:    Patient Active Problem List    Diagnosis Date Noted     Vomiting, persistent, in adult 2016     Priority: Medium     Acute pyelonephritis 2016     Priority: Medium     Persistent vomiting in adult patient 2016     Priority: Medium     Elevated liver enzymes 2016     Priority: Medium     Status post  delivery 2013     Priority: Medium     Diagnosis updated by automated process. Provider to review and confirm.       Status post  delivery 2013     Priority: Medium     Obesity 2013     Priority: Medium     SVT (supraventricular tachycardia) (H) 2013     Priority: Medium     Drug use 1st trimester 2013     Priority: Medium        Past Medical History:    Past Medical History:   Diagnosis Date     Asthma      Seasonal allergies      SVT (supraventricular tachycardia) (H)        Past Surgical History:    Past Surgical History:   Procedure Laterality Date      SECTION  2013    Procedure:  SECTION;;  Surgeon: Mally Danielson MD;  Location: [facility]     TONSILLECTOMY         Family History:    Family History   Problem Relation Age of Onset     Diabetes Sister         Type 1     Diabetes Paternal Grandmother         Type 2     Thyroid Disease Mother         Hypothyroidism      Heart Disease Maternal Grandmother         Pacemaker     Thyroid Disease Maternal Grandmother         Thyroid Disorder       Social History:  Marital Status:   [2]  Social History     Tobacco Use     Smoking status: Former Smoker     Smokeless tobacco: Never Used   Substance Use Topics     Alcohol use: No     Drug use: No        Medications:      neomycin-polymyxin-hydrocortisone (CORTISPORIN) 3.5-58019-3 otic suspension   ibuprofen (ADVIL,MOTRIN) 800 MG tablet   oxyCODONE (ROXICODONE) 5 mg         Review of Systems   Constitutional: Positive for activity change. Negative for chills and fever.   HENT: Positive for ear discharge, ear pain and rhinorrhea. Negative for sinus pressure and sinus pain.    Eyes: Negative.    Respiratory: Negative for cough and shortness of breath.    Gastrointestinal: Positive for diarrhea and nausea. Negative for abdominal pain and vomiting.   Neurological: Positive for headaches.       Physical Exam   BP: 127/74  Heart Rate: 94  Temp: 98.2  F (36.8  C)  Resp: 16  SpO2: 98 %      Physical Exam   Constitutional: She is oriented to person, place, and time. She appears well-developed and well-nourished. No distress.   HENT:   Head: Normocephalic.   Right Ear: Tympanic membrane normal.   Left Ear: Tympanic membrane normal.   Nose: Rhinorrhea present. Right sinus exhibits frontal sinus tenderness. Right sinus exhibits no maxillary sinus tenderness. Left sinus exhibits frontal sinus tenderness. Left sinus exhibits no maxillary sinus tenderness.   Mouth/Throat: Uvula is midline, oropharynx is clear and moist and mucous membranes are normal.    erythema and dry skin built up in bilateral ear canals. Tender with palpation of tragus and mastoid process.   Eyes: Conjunctivae are normal.   Neck: No thyromegaly present.   Cardiovascular: Normal rate, regular rhythm and normal heart sounds.   No murmur heard.  Pulmonary/Chest: Effort normal and breath sounds normal. She has no wheezes.    Lymphadenopathy:        Head (right side): No submental, no submandibular, no tonsillar, no preauricular, no posterior auricular and no occipital adenopathy present.        Head (left side): No submental, no submandibular, no tonsillar, no preauricular, no posterior auricular and no occipital adenopathy present.     She has no cervical adenopathy.        Right: No supraclavicular adenopathy present.        Left: No supraclavicular adenopathy present.   Neurological: She is alert and oriented to person, place, and time.   Skin: Skin is warm and dry.   Nursing note and vitals reviewed.      ED Course        Procedures               No results found for this or any previous visit (from the past 24 hour(s)).    Medications - No data to display    Assessments & Plan (with Medical Decision Making)     I have reviewed the nursing notes.    I have reviewed the findings, diagnosis, plan and need for follow up with the patient.  Bilateral otitis externa. Prescribed polymyxin qid for seven days. Acetaminophen for discomfort. Discharge instructions and medications reviewed with her and understanding verbalized.      Discharge Medication List as of 9/20/2019  1:32 PM      START taking these medications    Details   neomycin-polymyxin-hydrocortisone (CORTISPORIN) 3.5-71938-4 otic suspension Place 3 drops into both ears 4 times daily for 7 days, Disp-5 mL, R-0, E-Prescribe             Final diagnoses:   Noninfectious otitis externa of left ear, unspecified chronicity, unspecified type       9/20/2019   [facility]     Matilde Abdul, CNP  09/20/19 5007

## 2019-09-20 NOTE — ED AVS SNAPSHOT
[facility]  [address] [address]  Phone:  [phone]                                    Woody Clark   MRN: [id number]    Department:  [facility]   Date of Visit:  9/20/2019           After Visit Summary Signature Page    I have received my discharge instructions, and my questions have been answered. I have discussed any challenges I see with this plan with the nurse or doctor.    ..........................................................................................................................................  Patient/Patient Representative Signature      ..........................................................................................................................................  Patient Representative Print Name and Relationship to Patient    ..................................................               ................................................  Date                                   Time    ..........................................................................................................................................  Reviewed by Signature/Title    ...................................................              ..............................................  Date                                               Time          22EPIC Rev 08/18

## 2019-09-20 NOTE — DISCHARGE INSTRUCTIONS
Wash hands before and after instilling ear drops into infected ear. Warm ear drops between hands for 1-2 minutes before applying. After medication instilled pump on the tragus 3-4 times to ensure medication is reaching ear. Should remain quiet with infected ear upwards for at least five minutes after instillation of ear drops.

## 2021-01-21 ENCOUNTER — HOSPITAL ENCOUNTER (EMERGENCY)
Facility: HOSPITAL | Age: 31
Discharge: HOME | End: 2021-01-21
Attending: NURSE PRACTITIONER | Admitting: NURSE PRACTITIONER
Payer: COMMERCIAL

## 2021-01-21 VITALS
SYSTOLIC BLOOD PRESSURE: 125 MMHG | TEMPERATURE: 97.7 F | RESPIRATION RATE: 16 BRPM | HEART RATE: 79 BPM | DIASTOLIC BLOOD PRESSURE: 76 MMHG | OXYGEN SATURATION: 98 %

## 2021-01-21 DIAGNOSIS — H65.01 RIGHT ACUTE SEROUS OTITIS MEDIA, RECURRENCE NOT SPECIFIED: ICD-10-CM

## 2021-01-21 DIAGNOSIS — H60.391 INFECTIVE OTITIS EXTERNA, RIGHT: ICD-10-CM

## 2021-01-21 PROCEDURE — 99213 OFFICE O/P EST LOW 20 MIN: CPT | Performed by: NURSE PRACTITIONER

## 2021-01-21 PROCEDURE — G0463 HOSPITAL OUTPT CLINIC VISIT: HCPCS

## 2021-01-21 RX ORDER — AMOXICILLIN 500 MG/1
500 CAPSULE ORAL 2 TIMES DAILY
Qty: 40 CAPSULE | Refills: 0 | Status: SHIPPED | OUTPATIENT
Start: 2021-01-21 | End: 2021-01-26

## 2021-01-21 RX ORDER — ACETAMINOPHEN 500 MG
1000 TABLET ORAL
COMMUNITY
Start: 2020-05-07

## 2021-01-21 RX ORDER — NEOMYCIN SULFATE, POLYMYXIN B SULFATE, HYDROCORTISONE 3.5; 10000; 1 MG/ML; [USP'U]/ML; MG/ML
3 SOLUTION/ DROPS AURICULAR (OTIC) 4 TIMES DAILY
Qty: 10 ML | Refills: 0 | Status: SHIPPED | OUTPATIENT
Start: 2021-01-21 | End: 2021-01-29 | Stop reason: ALTCHOICE

## 2021-01-21 ASSESSMENT — ENCOUNTER SYMPTOMS
SINUS PRESSURE: 0
SHORTNESS OF BREATH: 0
FACIAL SWELLING: 0
FEVER: 0
EYE DISCHARGE: 0
EYE PAIN: 0
SORE THROAT: 0
MUSCULOSKELETAL NEGATIVE: 1
SINUS PAIN: 0
RHINORRHEA: 0
FATIGUE: 0
COUGH: 0
GASTROINTESTINAL NEGATIVE: 1

## 2021-01-21 NOTE — ED PROVIDER NOTES
"  History     Chief Complaint   Patient presents with     Otalgia     HPI     Woody Clark is a 30 year old female who presents to the urgent care today for evaluation of a 24-hour-history of right ear pain. Reports a \"plugged\" sensation to right ear along with severe tenderness upon any movement of her ear. Pain does radiate down into the right side of her neck. Rates the pain a 6 on the 10 point scale. Denies any fevers, nasal congestion, sinus pressure, sore throat, or cough. Has not tried any over the counter medications at home for the discomfort. History of recurrent otitis externa and acute otitis media.     Allergies:  Allergies   Allergen Reactions     Ciprofloxacin      Latex      Seasonal Allergies        Problem List:    Patient Active Problem List    Diagnosis Date Noted     Vomiting, persistent, in adult 2016     Priority: Medium     Acute pyelonephritis 2016     Priority: Medium     Persistent vomiting in adult patient 2016     Priority: Medium     Elevated liver enzymes 2016     Priority: Medium     Status post  delivery 2013     Priority: Medium     Diagnosis updated by automated process. Provider to review and confirm.       Status post  delivery 2013     Priority: Medium     Obesity 2013     Priority: Medium     SVT (supraventricular tachycardia) (H) 2013     Priority: Medium     Drug use 1st trimester 2013     Priority: Medium        Past Medical History:    Past Medical History:   Diagnosis Date     Asthma      Seasonal allergies      SVT (supraventricular tachycardia) (H)        Past Surgical History:    Past Surgical History:   Procedure Laterality Date      SECTION  2013    Procedure:  SECTION;;  Surgeon: Mally Danielson MD;  Location: [facility]     TONSILLECTOMY         Family History:    Family History   Problem Relation Age of Onset     Diabetes Sister         Type 1     Diabetes Paternal Grandmother "         Type 2     Thyroid Disease Mother         Hypothyroidism     Heart Disease Maternal Grandmother         Pacemaker     Thyroid Disease Maternal Grandmother         Thyroid Disorder       Social History:  Marital Status:   [2]  Social History     Tobacco Use     Smoking status: Former Smoker     Smokeless tobacco: Never Used   Substance Use Topics     Alcohol use: No     Drug use: No        Medications:         acetaminophen (TYLENOL) 500 MG tablet       amoxicillin (AMOXIL) 500 MG capsule       neomycin-polymyxin-hydrocortisone (CORTISPORIN) 3.5-78610-4 otic solution       UNKNOWN TO PATIENT          Review of Systems   Constitutional: Negative for fatigue and fever.   HENT: Positive for ear discharge, ear pain and tinnitus. Negative for congestion, facial swelling, rhinorrhea, sinus pressure, sinus pain and sore throat.    Eyes: Negative for pain and discharge.   Respiratory: Negative for cough and shortness of breath.    Cardiovascular: Negative for chest pain.   Gastrointestinal: Negative.    Musculoskeletal: Negative.    Skin: Negative.    All other systems reviewed and are negative.      Physical Exam   BP: 125/76  Pulse: 79  Temp: 97.7  F (36.5  C)  Resp: 16  SpO2: 98 %      Physical Exam  Vitals signs and nursing note reviewed.   Constitutional:       Appearance: Normal appearance. She is not ill-appearing.   HENT:      Head: Normocephalic and atraumatic.      Right Ear: Decreased hearing noted. Drainage, swelling and tenderness present. Tympanic membrane is erythematous.      Left Ear: Tympanic membrane and ear canal normal.      Ears:      Comments: Purulent drainage and swelling within right ear canal. Extensive tenderness on manipulation of right helix. TM difficult to visualize due to swelling and drainage within canal, erythema present.      Nose: No congestion.      Mouth/Throat:      Mouth: Mucous membranes are moist.      Pharynx: No oropharyngeal exudate or posterior oropharyngeal  erythema.   Eyes:      Conjunctiva/sclera: Conjunctivae normal.   Cardiovascular:      Rate and Rhythm: Normal rate and regular rhythm.   Pulmonary:      Effort: Pulmonary effort is normal.      Breath sounds: Normal breath sounds.   Lymphadenopathy:      Cervical: Cervical adenopathy present.      Right cervical: Superficial cervical adenopathy present.   Skin:     General: Skin is warm and dry.   Neurological:      Mental Status: She is alert.   Psychiatric:         Mood and Affect: Mood normal.         Behavior: Behavior normal.         ED Course        Procedures                No results found for this or any previous visit (from the past 24 hour(s)).    Medications - No data to display    Assessments & Plan (with Medical Decision Making)     I have reviewed the nursing notes.    I have reviewed the findings, diagnosis, plan and need for follow up with the patient.    Physical exam consistent with otitis externa along with acute otitis media. Extensive tenderness noted with any manipulation of right helix. Patient will be treated with amoxicillin 500 mg BID for 10 days along with neomycin-polymyxin-hydrocortisone otic solution 4 times per day for 10 days due to patient's allergy of ciprofloxacin. Encouraged patient to place cotton ball in right ear after ear drop administration. Discussed need to take antibiotics as prescribed. May apply warm compresses and utilize Ibuprofen/Acetaminphen for discomfort. Patient verbalized complete understanding and is happy with the plan of care. No questions or concerns upon discharge. Encouraged to follow up with primary care if symptoms are not improving or return here with any further concerns.   Patient verbally educated and given appropriate education sheets for the diagnoses and has no questions.  Take medications as directed.   Follow up with your Primary Care provider if symptoms increase or if further concerns develop, return to the ER      Discharge Medication List  as of 1/21/2021  9:30 AM      START taking these medications    Details   amoxicillin (AMOXIL) 500 MG capsule Take 1 capsule (500 mg) by mouth 2 times daily for 10 days, Disp-40 capsule, R-0, E-Prescribe      neomycin-polymyxin-hydrocortisone (CORTISPORIN) 3.5-18578-1 otic solution Place 3 drops into the right ear 4 times daily for 10 days, Disp-10 mL, R-0, E-Prescribe             Final diagnoses:   Infective otitis externa, right   Right acute serous otitis media, recurrence not specified       1/21/2021   [facility]    LIZET Green Student     I was present with the nurse practitioner student who participated in the service and in the documentation of the note. I have verified the history and personally performed the physical exam and medical decision making. I agree with the assessment and plan of care as documented in the note.     Paula PERRY FN-BC  Family Nurse Practitioner           Paula Nolasco, VICKY CNP  01/21/21 1891

## 2021-01-21 NOTE — DISCHARGE INSTRUCTIONS
Take ear drops and oral antibiotics as prescribed.     May apply warm compress for comfort. Take Ibuprofen or Acetaminophen as needed for the discomfort.     Follow up with primary care as needed or if symptoms are not improving.         External Ear Infection (Adult)    External otitis (also called  swimmer s ear ) is an infection in the ear canal. It is often caused by bacteria or fungus. It can occur a few days after water gets trapped in the ear canal (from swimming or bathing). It can also occur after cleaning too deeply in the ear canal with a cotton swab or other object. Sometimes, hair care products get into the ear canal and cause this problem.  Symptoms can include pain, fever, itching, redness, drainage, or swelling of the ear canal. Temporary hearing loss may also occur.  Home care  Do not try to clean the ear canal. This can push pus and bacteria deeper into the canal.  Use prescribed ear drops as directed. These help reduce swelling and fight the infection. If an ear wick was placed in the ear canal, apply drops right onto the end of the wick. The wick will draw the medicine into the ear canal even if it is swollen closed.  A cotton ball may be loosely placed in the outer ear to absorb any drainage.  You may use acetaminophen or ibuprofen to control pain, unless another medicine was prescribed. Note: If you have chronic liver or kidney disease or ever had a stomach ulcer or GI bleeding, talk to your healthcare provider before taking any of these medicines.  Do not allow water to get into your ear when bathing. Also, don't swim until the infection has cleared.  Prevention  Keep your ears dry. This helps lower the risk of infection. Dry your ears with a towel or hair dryer after getting wet. Also, use ear plugs when swimming.  Do not stick any objects in the ear to remove wax.  If you feel water trapped in your ear, use ear drops right away. You can get these drops over the counter at most drugstores.  They work by removing water from the ear canal.  Follow-up care  Follow up with your healthcare provider in 1 week, or as advised.  When to seek medical advice  Call your healthcare provider right away if any of these occur:  Ear pain becomes worse or doesn t improve after 3 days of treatment  Redness or swelling of the outer ear occurs or gets worse  Headache  Painful or stiff neck  Drowsiness or confusion  Fever of 100.4 F (38 C) or higher, or as directed by your healthcare provider  Teresa Peguero last reviewed this educational content on 10/1/2017    5397-0756 [employer]. [address] [place] [address]. All rights reserved. This information is not intended as a substitute for professional medical care. Always follow your healthcare professional's instructions.

## 2021-01-21 NOTE — ED TRIAGE NOTES
Right ear pain. Started yesterday, states its throbbing. No OTC. States she gets chronic ear infections frequently.

## 2021-01-23 ENCOUNTER — HOSPITAL ENCOUNTER (EMERGENCY)
Facility: HOSPITAL | Age: 31
Discharge: HOME | End: 2021-01-23
Attending: EMERGENCY MEDICINE | Admitting: EMERGENCY MEDICINE
Payer: COMMERCIAL

## 2021-01-23 ENCOUNTER — APPOINTMENT (OUTPATIENT)
Dept: CT IMAGING | Facility: HOSPITAL | Age: 31
End: 2021-01-23
Attending: EMERGENCY MEDICINE
Payer: COMMERCIAL

## 2021-01-23 VITALS
RESPIRATION RATE: 16 BRPM | OXYGEN SATURATION: 98 % | SYSTOLIC BLOOD PRESSURE: 118 MMHG | TEMPERATURE: 98.6 F | HEART RATE: 72 BPM | DIASTOLIC BLOOD PRESSURE: 58 MMHG

## 2021-01-23 DIAGNOSIS — H60.92 OTITIS EXTERNA OF LEFT EAR, UNSPECIFIED CHRONICITY, UNSPECIFIED TYPE: ICD-10-CM

## 2021-01-23 LAB
ANION GAP SERPL CALCULATED.3IONS-SCNC: 2 MMOL/L (ref 3–14)
BASOPHILS # BLD AUTO: 0 10E9/L (ref 0–0.2)
BASOPHILS NFR BLD AUTO: 0.1 %
BUN SERPL-MCNC: 10 MG/DL (ref 7–30)
CALCIUM SERPL-MCNC: 8.5 MG/DL (ref 8.5–10.1)
CHLORIDE SERPL-SCNC: 111 MMOL/L (ref 94–109)
CO2 SERPL-SCNC: 27 MMOL/L (ref 20–32)
CREAT SERPL-MCNC: 0.75 MG/DL (ref 0.52–1.04)
CRP SERPL-MCNC: 44.8 MG/L (ref 0–8)
DIFFERENTIAL METHOD BLD: NORMAL
EOSINOPHIL # BLD AUTO: 0.1 10E9/L (ref 0–0.7)
EOSINOPHIL NFR BLD AUTO: 0.7 %
ERYTHROCYTE [DISTWIDTH] IN BLOOD BY AUTOMATED COUNT: 12 % (ref 10–15)
GFR SERPL CREATININE-BSD FRML MDRD: >90 ML/MIN/{1.73_M2}
GLUCOSE SERPL-MCNC: 102 MG/DL (ref 70–99)
HCG SERPL QL: NEGATIVE
HCT VFR BLD AUTO: 41.8 % (ref 35–47)
HGB BLD-MCNC: 14.8 G/DL (ref 11.7–15.7)
IMM GRANULOCYTES # BLD: 0 10E9/L (ref 0–0.4)
IMM GRANULOCYTES NFR BLD: 0.4 %
LYMPHOCYTES # BLD AUTO: 1.2 10E9/L (ref 0.8–5.3)
LYMPHOCYTES NFR BLD AUTO: 14.7 %
MCH RBC QN AUTO: 29.6 PG (ref 26.5–33)
MCHC RBC AUTO-ENTMCNC: 35.4 G/DL (ref 31.5–36.5)
MCV RBC AUTO: 84 FL (ref 78–100)
MONOCYTES # BLD AUTO: 0.6 10E9/L (ref 0–1.3)
MONOCYTES NFR BLD AUTO: 7.8 %
NEUTROPHILS # BLD AUTO: 6.1 10E9/L (ref 1.6–8.3)
NEUTROPHILS NFR BLD AUTO: 76.3 %
NRBC # BLD AUTO: 0 10*3/UL
NRBC BLD AUTO-RTO: 0 /100
PLATELET # BLD AUTO: 265 10E9/L (ref 150–450)
POTASSIUM SERPL-SCNC: 4.1 MMOL/L (ref 3.4–5.3)
RBC # BLD AUTO: 5 10E12/L (ref 3.8–5.2)
SODIUM SERPL-SCNC: 140 MMOL/L (ref 133–144)
WBC # BLD AUTO: 8 10E9/L (ref 4–11)

## 2021-01-23 PROCEDURE — 86140 C-REACTIVE PROTEIN: CPT | Performed by: EMERGENCY MEDICINE

## 2021-01-23 PROCEDURE — 255N000002 HC RX 255 OP 636: Performed by: EMERGENCY MEDICINE

## 2021-01-23 PROCEDURE — 85025 COMPLETE CBC W/AUTO DIFF WBC: CPT | Performed by: EMERGENCY MEDICINE

## 2021-01-23 PROCEDURE — 36415 COLL VENOUS BLD VENIPUNCTURE: CPT

## 2021-01-23 PROCEDURE — 250N000011 HC RX IP 250 OP 636: Performed by: EMERGENCY MEDICINE

## 2021-01-23 PROCEDURE — 87186 SC STD MICRODIL/AGAR DIL: CPT | Performed by: EMERGENCY MEDICINE

## 2021-01-23 PROCEDURE — 96375 TX/PRO/DX INJ NEW DRUG ADDON: CPT

## 2021-01-23 PROCEDURE — 87070 CULTURE OTHR SPECIMN AEROBIC: CPT | Performed by: EMERGENCY MEDICINE

## 2021-01-23 PROCEDURE — 96376 TX/PRO/DX INJ SAME DRUG ADON: CPT

## 2021-01-23 PROCEDURE — 99285 EMERGENCY DEPT VISIT HI MDM: CPT | Mod: 25

## 2021-01-23 PROCEDURE — 99284 EMERGENCY DEPT VISIT MOD MDM: CPT | Performed by: EMERGENCY MEDICINE

## 2021-01-23 PROCEDURE — 87077 CULTURE AEROBIC IDENTIFY: CPT | Performed by: EMERGENCY MEDICINE

## 2021-01-23 PROCEDURE — 70470 CT HEAD/BRAIN W/O & W/DYE: CPT

## 2021-01-23 PROCEDURE — 80048 BASIC METABOLIC PNL TOTAL CA: CPT | Performed by: EMERGENCY MEDICINE

## 2021-01-23 PROCEDURE — 96365 THER/PROPH/DIAG IV INF INIT: CPT | Mod: XU

## 2021-01-23 PROCEDURE — 84703 CHORIONIC GONADOTROPIN ASSAY: CPT | Performed by: EMERGENCY MEDICINE

## 2021-01-23 RX ORDER — OXYCODONE HYDROCHLORIDE 5 MG/1
5 TABLET ORAL EVERY 6 HOURS PRN
Qty: 12 TABLET | Refills: 0 | Status: SHIPPED | OUTPATIENT
Start: 2021-01-23 | End: 2021-01-23

## 2021-01-23 RX ORDER — MORPHINE SULFATE 4 MG/ML
4 INJECTION, SOLUTION INTRAMUSCULAR; INTRAVENOUS ONCE
Status: COMPLETED | OUTPATIENT
Start: 2021-01-23 | End: 2021-01-23

## 2021-01-23 RX ORDER — KETOROLAC TROMETHAMINE 30 MG/ML
30 INJECTION, SOLUTION INTRAMUSCULAR; INTRAVENOUS ONCE
Status: COMPLETED | OUTPATIENT
Start: 2021-01-23 | End: 2021-01-23

## 2021-01-23 RX ORDER — IOPAMIDOL 612 MG/ML
100 INJECTION, SOLUTION INTRAVASCULAR ONCE
Status: COMPLETED | OUTPATIENT
Start: 2021-01-23 | End: 2021-01-23

## 2021-01-23 RX ORDER — OXYCODONE HYDROCHLORIDE 5 MG/1
5 TABLET ORAL EVERY 6 HOURS PRN
Qty: 12 TABLET | Refills: 0 | Status: SHIPPED | OUTPATIENT
Start: 2021-01-23 | End: 2021-11-05

## 2021-01-23 RX ADMIN — MORPHINE SULFATE 4 MG: 4 INJECTION, SOLUTION INTRAMUSCULAR; INTRAVENOUS at 08:55

## 2021-01-23 RX ADMIN — IOPAMIDOL 100 ML: 612 INJECTION, SOLUTION INTRAVENOUS at 08:45

## 2021-01-23 RX ADMIN — KETOROLAC TROMETHAMINE 30 MG: 30 INJECTION, SOLUTION INTRAMUSCULAR at 06:40

## 2021-01-23 RX ADMIN — TAZOBACTAM SODIUM AND PIPERACILLIN SODIUM 4.5 G: 500; 4 INJECTION, SOLUTION INTRAVENOUS at 06:53

## 2021-01-23 RX ADMIN — MORPHINE SULFATE 4 MG: 4 INJECTION, SOLUTION INTRAMUSCULAR; INTRAVENOUS at 06:42

## 2021-01-23 ASSESSMENT — ENCOUNTER SYMPTOMS
SLEEP DISTURBANCE: 0
ENDOCRINE NEGATIVE: 1
ALLERGIC/IMMUNOLOGIC NEGATIVE: 1
CONSTITUTIONAL NEGATIVE: 1
GASTROINTESTINAL NEGATIVE: 1
PHOTOPHOBIA: 0
CHILLS: 0
MYALGIAS: 0
NECK PAIN: 0
RESPIRATORY NEGATIVE: 1
NEUROLOGICAL NEGATIVE: 1
PSYCHIATRIC NEGATIVE: 1
NECK STIFFNESS: 0
EYES NEGATIVE: 1
FEVER: 0
MUSCULOSKELETAL NEGATIVE: 1
NERVOUS/ANXIOUS: 0
HEMATOLOGIC/LYMPHATIC NEGATIVE: 1
CARDIOVASCULAR NEGATIVE: 1

## 2021-01-23 NOTE — ED TRIAGE NOTES
"Patient states she was seen in Urgent Care on Thursday for ear infection in the right ear. Patient was started on amoxicillin and ear drops. Patient has been taking/using those along with ibuprofen and did take \"half a lortab\" before bed last night. Patient feels \"like nothing\" is taking the pain away. Patient notes she is having difficulty sleeping.  "

## 2021-01-23 NOTE — ED NOTES
IV was removed.  Discharge instructions reviewed with patient.  Patient verbalized understanding.  Patient has one printed RX and one RX at [facility] to .  Patient also is aware that she has an ENT referral and she should hear from them by Tuesday if not call the number provided for follow up.  Patient without any other concerns at this time.  Home with friend.

## 2021-01-23 NOTE — ED PROVIDER NOTES
History     Chief Complaint   Patient presents with     Otalgia     right ear, on antibiotics since Thursday     HPI  Woody Clark is a 30 year old female who presents today with complaints of right-sided otalgia.  Symptoms began approximately 3 days ago.  Was seen in the urgent care and treated for otitis externa.  Patient states the pain is gradually worsened prompting ER visit.  Patient has otherwise been in usual state of health.  No additional complaints.    Allergies:  Allergies   Allergen Reactions     Ciprofloxacin      Latex      Seasonal Allergies        Problem List:    Patient Active Problem List    Diagnosis Date Noted     Vomiting, persistent, in adult 2016     Priority: Medium     Acute pyelonephritis 2016     Priority: Medium     Persistent vomiting in adult patient 2016     Priority: Medium     Elevated liver enzymes 2016     Priority: Medium     Status post  delivery 2013     Priority: Medium     Diagnosis updated by automated process. Provider to review and confirm.       Status post  delivery 2013     Priority: Medium     Obesity 2013     Priority: Medium     SVT (supraventricular tachycardia) (H) 2013     Priority: Medium     Drug use 1st trimester 2013     Priority: Medium        Past Medical History:    Past Medical History:   Diagnosis Date     Asthma      Seasonal allergies      SVT (supraventricular tachycardia) (H)        Past Surgical History:    Past Surgical History:   Procedure Laterality Date      SECTION  2013    Procedure:  SECTION;;  Surgeon: Mally Danielson MD;  Location: [facility]     TONSILLECTOMY         Family History:    Family History   Problem Relation Age of Onset     Diabetes Sister         Type 1     Diabetes Paternal Grandmother         Type 2     Thyroid Disease Mother         Hypothyroidism     Heart Disease Maternal Grandmother         Pacemaker     Thyroid Disease Maternal  Grandmother         Thyroid Disorder       Social History:  Marital Status:   [2]  Social History     Tobacco Use     Smoking status: Former Smoker     Smokeless tobacco: Never Used   Substance Use Topics     Alcohol use: No     Drug use: No        Medications:         acetaminophen (TYLENOL) 500 MG tablet       amoxicillin (AMOXIL) 500 MG capsule       neomycin-polymyxin-hydrocortisone (CORTISPORIN) 3.5-85460-2 otic solution       UNKNOWN TO PATIENT          Review of Systems   Constitutional: Negative.  Negative for chills and fever.   HENT: Positive for ear pain.    Eyes: Negative.  Negative for photophobia.   Respiratory: Negative.    Cardiovascular: Negative.    Gastrointestinal: Negative.    Endocrine: Negative.    Genitourinary: Negative.    Musculoskeletal: Negative.  Negative for myalgias, neck pain and neck stiffness.   Skin: Negative.    Allergic/Immunologic: Negative.    Neurological: Negative.    Hematological: Negative.    Psychiatric/Behavioral: Negative.  Negative for self-injury, sleep disturbance and suicidal ideas. The patient is not nervous/anxious.        Physical Exam   BP: 124/66  Pulse: 95  Temp: 99.1  F (37.3  C)  Resp: 16  SpO2: 98 %      Physical Exam  Constitutional:       General: She is not in acute distress.     Appearance: Normal appearance. She is normal weight. She is not toxic-appearing.   HENT:      Head: Normocephalic and atraumatic.      Ears:      Comments: Fluid and discharge noted in right ear canal.  Pain with movements of right pinna.  No discrete swelling noted.  Tenderness over mastoid process on the right side.  Eyes:      Conjunctiva/sclera: Conjunctivae normal.   Neck:      Musculoskeletal: Normal range of motion.   Cardiovascular:      Rate and Rhythm: Normal rate and regular rhythm.   Pulmonary:      Effort: Pulmonary effort is normal.   Neurological:      Mental Status: She is alert.   Psychiatric:         Mood and Affect: Mood normal.         Behavior:  Behavior normal.         Thought Content: Thought content normal.         Judgment: Judgment normal.         ED Course     ED Course as of Jan 24 0532   Sat Jan 23, 2021   0915 Patient feeling better.       0931 Signed out to Dr. Nguyen. Head CT pending. If negative for mastoiditis, osteomyelitis, malignant otitis externa or abscess, may discharge home with Augmentin. Otherwise ENT consult and possible admission.         Procedures        Results for orders placed or performed during the hospital encounter of 01/23/21 (from the past 24 hour(s))   CBC with platelets differential   Result Value Ref Range    WBC 8.0 4.0 - 11.0 10e9/L    RBC Count 5.00 3.8 - 5.2 10e12/L    Hemoglobin 14.8 11.7 - 15.7 g/dL    Hematocrit 41.8 35.0 - 47.0 %    MCV 84 78 - 100 fl    MCH 29.6 26.5 - 33.0 pg    MCHC 35.4 31.5 - 36.5 g/dL    RDW 12.0 10.0 - 15.0 %    Platelet Count 265 150 - 450 10e9/L    Diff Method Automated Method     % Neutrophils 76.3 %    % Lymphocytes 14.7 %    % Monocytes 7.8 %    % Eosinophils 0.7 %    % Basophils 0.1 %    % Immature Granulocytes 0.4 %    Nucleated RBCs 0 0 /100    Absolute Neutrophil 6.1 1.6 - 8.3 10e9/L    Absolute Lymphocytes 1.2 0.8 - 5.3 10e9/L    Absolute Monocytes 0.6 0.0 - 1.3 10e9/L    Absolute Eosinophils 0.1 0.0 - 0.7 10e9/L    Absolute Basophils 0.0 0.0 - 0.2 10e9/L    Abs Immature Granulocytes 0.0 0 - 0.4 10e9/L    Absolute Nucleated RBC 0.0    Basic metabolic panel   Result Value Ref Range    Sodium 140 133 - 144 mmol/L    Potassium 4.1 3.4 - 5.3 mmol/L    Chloride 111 (H) 94 - 109 mmol/L    Carbon Dioxide 27 20 - 32 mmol/L    Anion Gap 2 (L) 3 - 14 mmol/L    Glucose 102 (H) 70 - 99 mg/dL    Urea Nitrogen 10 7 - 30 mg/dL    Creatinine 0.75 0.52 - 1.04 mg/dL    GFR Estimate >90 >60 mL/min/[1.73_m2]    GFR Estimate If Black >90 >60 mL/min/[1.73_m2]    Calcium 8.5 8.5 - 10.1 mg/dL   CRP inflammation   Result Value Ref Range    CRP Inflammation 44.8 (H) 0.0 - 8.0 mg/L   HCG qualitative  Blood   Result Value Ref Range    HCG Qualitative Serum Negative NEG^Negative       Medications   morphine (PF) injection 4 mg (has no administration in time range)   ketorolac (TORADOL) injection 30 mg (has no administration in time range)       Assessments & Plan (with Medical Decision Making)     New Prescriptions    No medications on file       Final diagnoses:   Otitis externa of left ear, unspecified chronicity, unspecified type       1/23/2021   [facility]     Jose Luis Willard MD  01/24/21 7178

## 2021-01-23 NOTE — ED NOTES
Face to face report given with opportunity to observe patient.    Report given to GABI Montgomery RN   1/23/2021  7:17 AM

## 2021-01-23 NOTE — DISCHARGE INSTRUCTIONS
1) Follow the aftercare instructions provided.   2) Get plenty of rest  3) You have been given a prescription for a medication that can cause an allergic reactions. Return to the ER if you develop any itching, tongue swelling, wheezing or shortness of breath.  4) Discontinue the amoxicillin antibiotics. Start the new antibiotic pills today  5) You have been given a medication or prescription for a medication that can make you drowsy. Do not drink, drive, ride a bicycle or operate any heavy machinery while using this medication.

## 2021-01-23 NOTE — ED NOTES
"Patient to CT at this time.  Notes she is \"ok\" to wait for pain medications until she gets back.  "

## 2021-01-23 NOTE — ED PROVIDER NOTES
ED TRANSITION OF CARE / SIGNOUT    Brief HPI:  Patient is a 30-year-old female who presented with right earache.  She had been seen 3 days ago in the emergency department for right-sided otitis externa and was being treated with amoxicillin and Corticosporin eardrops.  She presents with increasing pain.  She is vitally stable, afebrile, has normal white blood cell count but elevated CRP.  She also had mastoid tenderness was sent for CT.    MDM:  Followed up on CT result which revealed external ear infection as well as some evidence of otitis media.  There is no evidence of mastoiditis on CT.  She was rechecked and appeared more comfortable after imaging here.  She will proceed with broadening her antibiotics to Augmentin as prescribed by Dr. Willard, also oxycodone for breakthrough pain control.  Placed an otolaryngology referral for her to pursue this week.  She was comfortable with the discharge plan after we reviewed the results.  She left the emergency department in stable condition.      Cassius Nguyen MD  9:24 AM       Cassius Nguyen MD  01/23/21 1200

## 2021-01-25 LAB
BACTERIA SPEC CULT: ABNORMAL
SPECIMEN SOURCE: ABNORMAL

## 2021-01-26 ENCOUNTER — OFFICE VISIT (OUTPATIENT)
Dept: OTOLARYNGOLOGY | Facility: OTHER | Age: 31
End: 2021-01-26
Attending: EMERGENCY MEDICINE
Payer: COMMERCIAL

## 2021-01-26 VITALS
SYSTOLIC BLOOD PRESSURE: 112 MMHG | RESPIRATION RATE: 16 BRPM | DIASTOLIC BLOOD PRESSURE: 62 MMHG | OXYGEN SATURATION: 98 % | TEMPERATURE: 97.2 F | HEART RATE: 84 BPM

## 2021-01-26 DIAGNOSIS — H60.391 INFECTIVE OTITIS EXTERNA, RIGHT: Primary | ICD-10-CM

## 2021-01-26 PROCEDURE — 92504 EAR MICROSCOPY EXAMINATION: CPT | Performed by: NURSE PRACTITIONER

## 2021-01-26 PROCEDURE — 99213 OFFICE O/P EST LOW 20 MIN: CPT | Mod: 25 | Performed by: NURSE PRACTITIONER

## 2021-01-26 RX ORDER — CIPROFLOXACIN AND DEXAMETHASONE 3; 1 MG/ML; MG/ML
4 SUSPENSION/ DROPS AURICULAR (OTIC) 2 TIMES DAILY
Qty: 4 ML | Refills: 0 | Status: SHIPPED | OUTPATIENT
Start: 2021-01-26 | End: 2021-02-05

## 2021-01-26 RX ORDER — HYDROCODONE BITARTRATE AND ACETAMINOPHEN 5; 325 MG/1; MG/1
1 TABLET ORAL EVERY 6 HOURS PRN
Qty: 15 TABLET | Refills: 0 | Status: SHIPPED | OUTPATIENT
Start: 2021-01-26 | End: 2021-11-05

## 2021-01-26 RX ORDER — ACETAMINOPHEN AND CODEINE PHOSPHATE 120; 12 MG/5ML; MG/5ML
0.35 SOLUTION ORAL DAILY
COMMUNITY
Start: 2021-01-05 | End: 2021-11-05

## 2021-01-26 RX ORDER — CEFDINIR 300 MG/1
300 CAPSULE ORAL 2 TIMES DAILY
Qty: 20 CAPSULE | Refills: 0 | Status: SHIPPED | OUTPATIENT
Start: 2021-01-26 | End: 2021-02-05

## 2021-01-26 ASSESSMENT — PAIN SCALES - GENERAL: PAINLEVEL: MILD PAIN (3)

## 2021-01-26 NOTE — LETTER
1/26/2021         RE: Woody Clark  [address] [facility] [address]        Dear Colleague,    [facility] you [facility] Please see a copy of my visit note below.    Otolaryngology Note         Chief Complaint:     Patient presents with:  Ear Problem: new otitis externa of left ear, unspecified chronicity and type           History of Present Illness:     Woody Clark is a 30 year old female seen today for right otitis externa.  She was seen in the ED x 2, last visit was on 1/23/21, she was treated with Augmentin and cortisporin drops.      Symptoms started Wednesday 1/20/21, she was seen in the ED on Thursday.      CT scan of the head completed 1/23/21:  IMPRESSION: Opacification of the right external auditory canal with  mucosal thickening within the middle ear. There is a small amount of  fluid accumulation or mucosal thickening within right-sided mastoid  air cells. No concerning osteolytic changes are present.    Ear culture results from 1/23/21:  Component 3d ago   Specimen Description Ear INNER EAR    Culture Micro Abnormal   Heavy growth   Pseudomonas aeruginosa     Resulting [facility]   Susceptibility     Pseudomonas aeruginosa     DIANDRA     CEFEPIME <=1 ug/mL Sensitive     CEFTAZIDIME 4 ug/mL Sensitive     CIPROFLOXACIN <=0.25 ug/mL Sensitive     GENTAMICIN <=1 ug/mL Sensitive     IMIPENEM 1 ug/mL Sensitive     LEVOFLOXACIN 0.5 ug/mL Sensitive     Piperacillin/Tazo <=4 ug/mL Sensitive     TOBRAMYCIN <=1 ug/mL Sensitive              Today she reports symptoms have been improving.  Pain was improved with oxycodone but she has not been taking due to fatigue, she has small children.  She took ibuprofen today with some improvement.      She has decreased hearing due to plugged sensation   She has a history of recurrent OE as child, no concerns since adult.    She is former smoker   Patient  has no history of otological surgeries or procedures  No family hx of congential hearing loss, COM  She denies rotary vertigo, has been feeling like balance is off  No facial numbness or tingling.  She has pre-auricular swelling on the right side           Medications:     Current Outpatient Rx   Medication Sig Dispense Refill     acetaminophen (TYLENOL) 500 MG tablet Take 1,000 mg by mouth       amoxicillin-clavulanate (AUGMENTIN) 875-125 MG tablet Take 1 tablet by mouth 2 times daily for 10 days 20 tablet 0     neomycin-polymyxin-hydrocortisone (CORTISPORIN) 3.5-33590-3 otic solution Place 3 drops into the right ear 4 times daily for 10 days 10 mL 0     norethindrone (MICRONOR) 0.35 MG tablet Take 0.35 mg by mouth daily        oxyCODONE (ROXICODONE) 5 MG tablet Take 1 tablet (5 mg) by mouth every 6 hours as needed for pain 12 tablet 0            Allergies:     Allergies: Ciprofloxacin, Latex, and Seasonal allergies          Past Medical History:     Past Medical History:   Diagnosis Date     Asthma      Seasonal allergies      SVT (supraventricular tachycardia) (H)             Past Surgical History:     Past Surgical History:   Procedure Laterality Date      SECTION  2013    Procedure:  SECTION;;  Surgeon: Mally Danielson MD;  Location: [facility] OR     TONSILLECTOMY         ENT family history reviewed         Social History:     Social History     Tobacco Use     Smoking status: Former Smoker     Smokeless tobacco: Never Used   Substance Use Topics     Alcohol use: No     Drug use: No            Review of Systems:     ROS: See HPI         Physical Exam:     /62   Pulse 84   Temp 97.2  F (36.2  C) (Tympanic)   Resp 16   SpO2 98%     General - The patient is well nourished and well developed, and appears to have good nutritional status.  Alert and oriented to person and place, answers questions and cooperates with examination appropriately.  She appears non-toxic  Head and Face -  Normocephalic and atraumatic, with no gross asymmetry noted.  The facial nerve is intact, with strong symmetric movements.  Voice and Breathing - The patient was breathing comfortably without the use of accessory muscles. There was no wheezing, stridor. The patients voice was clear and strong, and had appropriate pitch and quality.  Ears - Ears are examined under binocular microscopy and with otoscope.  External ear normal. There is pre and post auricular adenopathy on the right.  The right canal has thick yellow otorrhea with mild canal edema.  The right canal was gently debrided with suctioning.  I instilled sterile saline into the ear to help dilute the otorrhea for easier removal.  Right canal has some friable areas with oozing.  Debrided to tolerance, I was able to see a portion of the TM with scant cerumen covering it.  The left canals is patent.  Left tympanic membrane is intact without effusion, retraction or mass.  Eyes - Extraocular movements intact, sclera were not icteric or injected, conjunctiva were pink and moist.  Mouth - Examination of the oral cavity showed pink, healthy oral mucosa. Dentition in good condition. No lesions or ulcerations noted. The tongue was mobile and midline.   Throat - The walls of the oropharynx were smooth, pink, moist, symmetric, and had no lesions or ulcerations.  The tonsillar pillars and soft palate were symmetric. The uvula was midline on elevation.    Neck - Normal midline excursion of the laryngotracheal complex during swallowing.  Full range of motion on passive movement.  Palpation of the occipital, submental, submandibular, internal jugular chain, and supraclavicular nodes did not demonstrate any abnormal lymph nodes or masses.  Palpation of the thyroid was soft and smooth, with no nodules or goiter appreciated.  The trachea was mobile and midline.  Nose - External contour is symmetric, no gross deflection or scars.  Nasal mucosa is pink and moist with no abnormal  mucus.  The septum and turbinates were evaluated: No polyps, masses, or purulence noted on examination.         Assessment and Plan:       ICD-10-CM    1. Otitis externa of left ear, unspecified chronicity, unspecified type  H60.92        Stop the cortisporin drops  Start ciprodex drops 4 drops 2 times per day x 10 day.  Ciprofloxacin listed as an allergy, she states she took when she had pyelonephritis and had nausea with it.  No rash or anaphylaxis.     Stop Augmentin, start Cefdinir oral antibiotic  Follow up on Friday in [facility]   Keep your ear dry     Janet GARCIA  [facility]NT        Again, thank you for allowing me to participate in the care of your patient.        Sincerely,        Janet Rod NP

## 2021-01-26 NOTE — PROGRESS NOTES
Otolaryngology Note         Chief Complaint:     Patient presents with:  Ear Problem: new otitis externa of left ear, unspecified chronicity and type           History of Present Illness:     Woody Clark is a 30 year old female seen today for right otitis externa.  She was seen in the ED x 2, last visit was on 1/23/21, she was treated with Augmentin and cortisporin drops.      Symptoms started Wednesday 1/20/21, she was seen in the ED on Thursday.      CT scan of the head completed 1/23/21:  IMPRESSION: Opacification of the right external auditory canal with  mucosal thickening within the middle ear. There is a small amount of  fluid accumulation or mucosal thickening within right-sided mastoid  air cells. No concerning osteolytic changes are present.    Ear culture results from 1/23/21:  Component 3d ago   Specimen Description Ear INNER EAR    Culture Micro Abnormal   Heavy growth   Pseudomonas aeruginosa     Resulting [facility]   Susceptibility     Pseudomonas aeruginosa     DIANDRA     CEFEPIME <=1 ug/mL Sensitive     CEFTAZIDIME 4 ug/mL Sensitive     CIPROFLOXACIN <=0.25 ug/mL Sensitive     GENTAMICIN <=1 ug/mL Sensitive     IMIPENEM 1 ug/mL Sensitive     LEVOFLOXACIN 0.5 ug/mL Sensitive     Piperacillin/Tazo <=4 ug/mL Sensitive     TOBRAMYCIN <=1 ug/mL Sensitive              Today she reports symptoms have been improving.  Pain was improved with oxycodone but she has not been taking due to fatigue, she has small children.  She took ibuprofen today with some improvement.      She has decreased hearing due to plugged sensation   She has a history of recurrent OE as child, no concerns since adult.    She is former smoker   Patient has no history of otological surgeries or procedures  No family hx of congential hearing loss, COM  She denies rotary vertigo, has been feeling like balance is off  No facial numbness or tingling.  She has pre-auricular swelling on the right  side           Medications:     Current Outpatient Rx   Medication Sig Dispense Refill     acetaminophen (TYLENOL) 500 MG tablet Take 1,000 mg by mouth       amoxicillin-clavulanate (AUGMENTIN) 875-125 MG tablet Take 1 tablet by mouth 2 times daily for 10 days 20 tablet 0     neomycin-polymyxin-hydrocortisone (CORTISPORIN) 3.5-81893-3 otic solution Place 3 drops into the right ear 4 times daily for 10 days 10 mL 0     norethindrone (MICRONOR) 0.35 MG tablet Take 0.35 mg by mouth daily        oxyCODONE (ROXICODONE) 5 MG tablet Take 1 tablet (5 mg) by mouth every 6 hours as needed for pain 12 tablet 0            Allergies:     Allergies: Ciprofloxacin, Latex, and Seasonal allergies          Past Medical History:     Past Medical History:   Diagnosis Date     Asthma      Seasonal allergies      SVT (supraventricular tachycardia) (H)             Past Surgical History:     Past Surgical History:   Procedure Laterality Date      SECTION  2013    Procedure:  SECTION;;  Surgeon: Mally Danielson MD;  Location: [facility] OR     TONSILLECTOMY         ENT family history reviewed         Social History:     Social History     Tobacco Use     Smoking status: Former Smoker     Smokeless tobacco: Never Used   Substance Use Topics     Alcohol use: No     Drug use: No            Review of Systems:     ROS: See HPI         Physical Exam:     /62   Pulse 84   Temp 97.2  F (36.2  C) (Tympanic)   Resp 16   SpO2 98%     General - The patient is well nourished and well developed, and appears to have good nutritional status.  Alert and oriented to person and place, answers questions and cooperates with examination appropriately.  She appears non-toxic  Head and Face - Normocephalic and atraumatic, with no gross asymmetry noted.  The facial nerve is intact, with strong symmetric movements.  Voice and Breathing - The patient was breathing comfortably without the use of accessory muscles. There was no wheezing,  stridor. The patients voice was clear and strong, and had appropriate pitch and quality.  Ears - Ears are examined under binocular microscopy and with otoscope.  External ear normal. There is pre and post auricular adenopathy on the right.  The right canal has thick yellow otorrhea with mild canal edema.  The right canal was gently debrided with suctioning.  I instilled sterile saline into the ear to help dilute the otorrhea for easier removal.  Right canal has some friable areas with oozing.  Debrided to tolerance, I was able to see a portion of the TM with scant cerumen covering it.  The left canals is patent.  Left tympanic membrane is intact without effusion, retraction or mass.  Eyes - Extraocular movements intact, sclera were not icteric or injected, conjunctiva were pink and moist.  Mouth - Examination of the oral cavity showed pink, healthy oral mucosa. Dentition in good condition. No lesions or ulcerations noted. The tongue was mobile and midline.   Throat - The walls of the oropharynx were smooth, pink, moist, symmetric, and had no lesions or ulcerations.  The tonsillar pillars and soft palate were symmetric. The uvula was midline on elevation.    Neck - Normal midline excursion of the laryngotracheal complex during swallowing.  Full range of motion on passive movement.  Palpation of the occipital, submental, submandibular, internal jugular chain, and supraclavicular nodes did not demonstrate any abnormal lymph nodes or masses.  Palpation of the thyroid was soft and smooth, with no nodules or goiter appreciated.  The trachea was mobile and midline.  Nose - External contour is symmetric, no gross deflection or scars.  Nasal mucosa is pink and moist with no abnormal mucus.  The septum and turbinates were evaluated: No polyps, masses, or purulence noted on examination.         Assessment and Plan:       ICD-10-CM    1. Otitis externa of left ear, unspecified chronicity, unspecified type  H60.92        Stop  the cortisporin drops  Start ciprodex drops 4 drops 2 times per day x 10 day.  Ciprofloxacin listed as an allergy, she states she took when she had pyelonephritis and had nausea with it.  No rash or anaphylaxis.     Stop Augmentin, start Cefdinir oral antibiotic  Follow up on Friday in [facility]   Keep your ear dry     Janet Rod NPPankajC  [facility]

## 2021-01-26 NOTE — NURSING NOTE
"Chief Complaint   Patient presents with     Ear Problem     new otitis externa of left ear, unspecified chronicity and type       Initial /62   Pulse 84   Temp 97.2  F (36.2  C) (Tympanic)   Resp 16   SpO2 98%  Estimated body mass index is 27.62 kg/m  as calculated from the following:    Height as of 2/28/16: 1.651 m (5' 5\").    Weight as of 3/1/16: 75.3 kg (166 lb 0.1 oz).  Medication Reconciliation: complete  Janet Cisse LPN    "

## 2021-01-26 NOTE — PATIENT INSTRUCTIONS
Stop the cortisporin drops  Start ciprodex drops 4 drops 2 times per day x 10 day  Stop Augmentin, start Cefdinir oral antibiotic  Follow up on Friday in [facility]   Keep your ear dry       Thank you for allowing Janet GARCIA and our ENT team to participate in your care.  If your medications are too expensive, please call my nurse at the number listed below.  We can possibly change this medication.    If you have a scheduling or an appointment question please contact our Health Unit Coordinator at their direct line [phone].   ALL nursing questions or concerns can be directed to your ENT nurses at: [phone] or [phone].

## 2021-01-29 ENCOUNTER — OFFICE VISIT (OUTPATIENT)
Dept: OTOLARYNGOLOGY | Facility: OTHER | Age: 31
End: 2021-01-29
Attending: NURSE PRACTITIONER
Payer: COMMERCIAL

## 2021-01-29 VITALS
SYSTOLIC BLOOD PRESSURE: 110 MMHG | HEART RATE: 82 BPM | DIASTOLIC BLOOD PRESSURE: 66 MMHG | TEMPERATURE: 97.2 F | BODY MASS INDEX: 34.39 KG/M2 | HEIGHT: 66 IN | RESPIRATION RATE: 16 BRPM | WEIGHT: 214 LBS | OXYGEN SATURATION: 97 %

## 2021-01-29 DIAGNOSIS — H60.391 OTHER INFECTIVE ACUTE OTITIS EXTERNA OF RIGHT EAR: Primary | ICD-10-CM

## 2021-01-29 PROCEDURE — 92504 EAR MICROSCOPY EXAMINATION: CPT | Performed by: NURSE PRACTITIONER

## 2021-01-29 PROCEDURE — 99213 OFFICE O/P EST LOW 20 MIN: CPT | Mod: 25 | Performed by: NURSE PRACTITIONER

## 2021-01-29 ASSESSMENT — MIFFLIN-ST. JEOR: SCORE: 1697.45

## 2021-01-29 ASSESSMENT — PAIN SCALES - GENERAL: PAINLEVEL: NO PAIN (0)

## 2021-01-29 NOTE — NURSING NOTE
"Chief Complaint   Patient presents with     Follow Up     infective OE       Initial Pulse 82   Temp 97.2  F (36.2  C) (Tympanic)   Resp 16   Ht 1.676 m (5' 6\")   Wt 97.1 kg (214 lb)   SpO2 97%   BMI 34.54 kg/m   Estimated body mass index is 34.54 kg/m  as calculated from the following:    Height as of this encounter: 1.676 m (5' 6\").    Weight as of this encounter: 97.1 kg (214 lb).  Medication Reconciliation: complete  Amparo Wright LPN  "

## 2021-01-29 NOTE — LETTER
2021         RE: Woody Clark  [address] [facility] [address]        Dear Colleague,    Thank you for referring your patient, Woody Clark, to the [facility]. Please see a copy of my visit note below.    Otolaryngology Note         Chief Complaint:     Patient presents with:  Follow Up: infective OE           History of Present Illness:     Woody Clark is a 30 year old female seen today for follow up right OE.  I last saw her on 21 for debridement.  We switched her to Ciprodex drops and Cefdinir per culture driven abx treatment.      Today she reports pain is almost resolved.  No further swelling noted.  She has been tolerating the drops and Cefdinir well.  No fevers or chills.           Medications:     Current Outpatient Rx   Medication Sig Dispense Refill     acetaminophen (TYLENOL) 500 MG tablet Take 1,000 mg by mouth       cefdinir (OMNICEF) 300 MG capsule Take 1 capsule (300 mg) by mouth 2 times daily for 10 days 20 capsule 0     ciprofloxacin-dexamethasone (CIPRODEX) 0.3-0.1 % otic suspension Place 4 drops into the right ear 2 times daily for 10 days 4 mL 0     HYDROcodone-acetaminophen (NORCO) 5-325 MG tablet Take 1 tablet by mouth every 6 hours as needed for severe pain 15 tablet 0     norethindrone (MICRONOR) 0.35 MG tablet Take 0.35 mg by mouth daily        oxyCODONE (ROXICODONE) 5 MG tablet Take 1 tablet (5 mg) by mouth every 6 hours as needed for pain (Patient not taking: Reported on 2021) 12 tablet 0            Allergies:     Allergies: Latex and Seasonal allergies          Past Medical History:     Past Medical History:   Diagnosis Date     Asthma      Seasonal allergies      SVT (supraventricular tachycardia) (H)             Past Surgical History:     Past Surgical History:   Procedure Laterality Date      SECTION  2013    Procedure:  SECTION;;  Surgeon: Mally Danielson MD;  Location: [facility] OR     TONSILLECTOMY         ENT  "family history reviewed         Social History:     Social History     Tobacco Use     Smoking status: Former Smoker     Smokeless tobacco: Never Used   Substance Use Topics     Alcohol use: No     Drug use: No            Review of Systems:     ROS: See HPI         Physical Exam:     /66   Pulse 82   Temp 97.2  F (36.2  C) (Tympanic)   Resp 16   Ht 1.676 m (5' 6\")   Wt 97.1 kg (214 lb)   SpO2 97%   BMI 34.54 kg/m      General - The patient is well nourished and well developed, and appears to have good nutritional status.  Alert and oriented to person and place, answers questions and cooperates with examination appropriately.   Head and Face - Normocephalic and atraumatic, with no gross asymmetry noted.  The facial nerve is intact, with strong symmetric movements.  Voice and Breathing - The patient was breathing comfortably without the use of accessory muscles. There was no wheezing, stridor. The patients voice was clear and strong, and had appropriate pitch and quality.  Ears - External ear normal. No pre or post auricular adenopathy noted.  No preauricular swelling noted.  The left canal is patent. The right canal has scant dried debris, no active otorrhea.  The canal was suctioned to tolerance.  She had pain with debridement so 2% lidocaine with epi was instilled in the canal and allowed to sit for about 5 minutes, this softened the debris and made debridement more tolerable.  Right tympanic membrane is intact without effusion, retraction or mass. Left tympanic membrane is intact without effusion, retraction or mass.  Eyes - Extraocular movements intact, sclera were not icteric or injected.  Neck -  Normal range of motion on passive and active movement.  Palpation of the occipital, submental, submandibular, internal jugular chain, and supraclavicular nodes did not demonstrate any abnormal lymph nodes or masses.  Palpation of the thyroid was soft and smooth, with no nodules or goiter appreciated.  The " trachea was mobile and midline.           Assessment and Plan:       ICD-10-CM    1. Other infective acute otitis externa of right ear  H60.391     resolving nicely     Continue to keep right ear dry x 1 week, then can return to normal activity  Complete 10 days of drops  Finish Cefdinir   Follow up as needed if symptoms worsen or new concerns develop.     Janet GARCIA  [facility]NT  10:01 AM  January 29, 2021        Again, thank you for allowing me to participate in the care of your patient.        Sincerely,        Janet Rod NP

## 2021-01-29 NOTE — PROGRESS NOTES
Otolaryngology Note         Chief Complaint:     Patient presents with:  Follow Up: infective OE           History of Present Illness:     Woody Clark is a 30 year old female seen today for follow up right OE.  I last saw her on 21 for debridement.  We switched her to Ciprodex drops and Cefdinir per culture driven abx treatment.      Today she reports pain is almost resolved.  No further swelling noted.  She has been tolerating the drops and Cefdinir well.  No fevers or chills.           Medications:     Current Outpatient Rx   Medication Sig Dispense Refill     acetaminophen (TYLENOL) 500 MG tablet Take 1,000 mg by mouth       cefdinir (OMNICEF) 300 MG capsule Take 1 capsule (300 mg) by mouth 2 times daily for 10 days 20 capsule 0     ciprofloxacin-dexamethasone (CIPRODEX) 0.3-0.1 % otic suspension Place 4 drops into the right ear 2 times daily for 10 days 4 mL 0     HYDROcodone-acetaminophen (NORCO) 5-325 MG tablet Take 1 tablet by mouth every 6 hours as needed for severe pain 15 tablet 0     norethindrone (MICRONOR) 0.35 MG tablet Take 0.35 mg by mouth daily        oxyCODONE (ROXICODONE) 5 MG tablet Take 1 tablet (5 mg) by mouth every 6 hours as needed for pain (Patient not taking: Reported on 2021) 12 tablet 0            Allergies:     Allergies: Latex and Seasonal allergies          Past Medical History:     Past Medical History:   Diagnosis Date     Asthma      Seasonal allergies      SVT (supraventricular tachycardia) (H)             Past Surgical History:     Past Surgical History:   Procedure Laterality Date      SECTION  2013    Procedure:  SECTION;;  Surgeon: Mally Danielson MD;  Location: [facility]     TONSILLECTOMY         ENT family history reviewed         Social History:     Social History     Tobacco Use     Smoking status: Former Smoker     Smokeless tobacco: Never Used   Substance Use Topics     Alcohol use: No     Drug use: No            Review of Systems:     ROS:  "See HPI         Physical Exam:     /66   Pulse 82   Temp 97.2  F (36.2  C) (Tympanic)   Resp 16   Ht 1.676 m (5' 6\")   Wt 97.1 kg (214 lb)   SpO2 97%   BMI 34.54 kg/m      General - The patient is well nourished and well developed, and appears to have good nutritional status.  Alert and oriented to person and place, answers questions and cooperates with examination appropriately.   Head and Face - Normocephalic and atraumatic, with no gross asymmetry noted.  The facial nerve is intact, with strong symmetric movements.  Voice and Breathing - The patient was breathing comfortably without the use of accessory muscles. There was no wheezing, stridor. The patients voice was clear and strong, and had appropriate pitch and quality.  Ears - External ear normal. No pre or post auricular adenopathy noted.  No preauricular swelling noted.  The left canal is patent. The right canal has scant dried debris, no active otorrhea.  The canal was suctioned to tolerance.  She had pain with debridement so 2% lidocaine with epi was instilled in the canal and allowed to sit for about 5 minutes, this softened the debris and made debridement more tolerable.  Right tympanic membrane is intact without effusion, retraction or mass. Left tympanic membrane is intact without effusion, retraction or mass.  Eyes - Extraocular movements intact, sclera were not icteric or injected.  Neck -  Normal range of motion on passive and active movement.  Palpation of the occipital, submental, submandibular, internal jugular chain, and supraclavicular nodes did not demonstrate any abnormal lymph nodes or masses.  Palpation of the thyroid was soft and smooth, with no nodules or goiter appreciated.  The trachea was mobile and midline.           Assessment and Plan:       ICD-10-CM    1. Other infective acute otitis externa of right ear  H60.391     resolving nicely     Continue to keep right ear dry x 1 week, then can return to normal " activity  Complete 10 days of drops  Finish Cefdinir   Follow up as needed if symptoms worsen or new concerns develop.     Janet GARCIA  [facility] ENT  10:01 AM  January 29, 2021

## 2021-09-10 ENCOUNTER — HOSPITAL ENCOUNTER (EMERGENCY)
Facility: HOSPITAL | Age: 31
Discharge: HOME | End: 2021-09-10
Attending: NURSE PRACTITIONER | Admitting: NURSE PRACTITIONER
Payer: COMMERCIAL

## 2021-09-10 VITALS
SYSTOLIC BLOOD PRESSURE: 99 MMHG | OXYGEN SATURATION: 98 % | DIASTOLIC BLOOD PRESSURE: 70 MMHG | TEMPERATURE: 98.1 F | RESPIRATION RATE: 18 BRPM | HEART RATE: 80 BPM

## 2021-09-10 DIAGNOSIS — S60.459A: Primary | ICD-10-CM

## 2021-09-10 DIAGNOSIS — S60.459A FOREIGN BODY OF FINGER OF LEFT HAND, INITIAL ENCOUNTER: ICD-10-CM

## 2021-09-10 PROCEDURE — 99213 OFFICE O/P EST LOW 20 MIN: CPT | Performed by: NURSE PRACTITIONER

## 2021-09-10 PROCEDURE — G0463 HOSPITAL OUTPT CLINIC VISIT: HCPCS

## 2021-09-10 ASSESSMENT — ENCOUNTER SYMPTOMS
COLOR CHANGE: 0
PSYCHIATRIC NEGATIVE: 1
WOUND: 0

## 2021-09-10 NOTE — ED PROVIDER NOTES
History     Chief Complaint   Patient presents with     Ring Removal (Cpm)     HPI  Woody Clark is a 31 year old female who presents to urgent care today with complaints of two stuck rings to left ring finger.  Patient placed rings on finger last night and this morning they were stuck.  Has attempted dental floss, Windex, aquafor and soap for removal which did not help.  No numbness or tingling, able to move hand/finger without difficulty.  No other concerns.      Allergies:  Allergies   Allergen Reactions     Latex      Seasonal Allergies        Problem List:    Patient Active Problem List    Diagnosis Date Noted     Vomiting, persistent, in adult 2016     Priority: Medium     Acute pyelonephritis 2016     Priority: Medium     Persistent vomiting in adult patient 2016     Priority: Medium     Elevated liver enzymes 2016     Priority: Medium     Status post  delivery 2013     Priority: Medium     Diagnosis updated by automated process. Provider to review and confirm.       Status post  delivery 2013     Priority: Medium     Obesity 2013     Priority: Medium     SVT (supraventricular tachycardia) (H) 2013     Priority: Medium     Drug use 1st trimester 2013     Priority: Medium        Past Medical History:    Past Medical History:   Diagnosis Date     Asthma      Seasonal allergies      SVT (supraventricular tachycardia) (H)        Past Surgical History:    Past Surgical History:   Procedure Laterality Date      SECTION  2013    Procedure:  SECTION;;  Surgeon: Mally Danielson MD;  Location: [facility]     TONSILLECTOMY         Family History:    Family History   Problem Relation Age of Onset     Diabetes Sister         Type 1     Diabetes Paternal Grandmother         Type 2     Thyroid Disease Mother         Hypothyroidism     Heart Disease Maternal Grandmother         Pacemaker     Thyroid Disease Maternal Grandmother          Thyroid Disorder       Social History:  Marital Status:   [2]  Social History     Tobacco Use     Smoking status: Former Smoker     Smokeless tobacco: Never Used   Substance Use Topics     Alcohol use: No     Drug use: No        Medications:    acetaminophen (TYLENOL) 500 MG tablet  HYDROcodone-acetaminophen (NORCO) 5-325 MG tablet  norethindrone (MICRONOR) 0.35 MG tablet  oxyCODONE (ROXICODONE) 5 MG tablet      Review of Systems   Musculoskeletal: Negative for gait problem.   Skin: Negative for color change and wound.        Ring stuck to left ring finger.   Psychiatric/Behavioral: Negative.      Physical Exam   BP: 99/70  Pulse: 80  Temp: 98.1  F (36.7  C)  Resp: 18  SpO2: 98 %    Physical Exam  Vitals and nursing note reviewed.   Cardiovascular:      Rate and Rhythm: Normal rate and regular rhythm.      Pulses: Normal pulses.      Heart sounds: Normal heart sounds.   Pulmonary:      Effort: Pulmonary effort is normal.      Breath sounds: Normal breath sounds.   Musculoskeletal:      Left hand: Swelling (left ring finger) present. No deformity, lacerations, tenderness or bony tenderness. Normal range of motion. Normal strength. Normal sensation. Normal capillary refill. Normal pulse.      Comments: Neurovascular status: Sensation is intact.  Distal pulses 2+.      Skin:     General: Skin is warm and dry.      Capillary Refill: Capillary refill takes less than 2 seconds.      Comments: Ring removed from left ring finger.   Psychiatric:         Mood and Affect: Mood normal.       ED Course     No results found for this or any previous visit (from the past 24 hour(s)).    Medications - No data to display    Assessments & Plan (with Medical Decision Making)     I have reviewed the nursing notes.    I have reviewed the findings, diagnosis, plan and need for follow up with the patient.  (D92.995F) Foreign body of finger of left hand  (primary encounter diagnosis)  Plan:  She had 2 rings stuck to left ring  finger.  First ring cut off which was smaller in size than the other.  Patient does not want second ring cut off at this time, wants to attempt to elevate and ice finger and see if able to remove wedding ring.  No open skin areas.  Full ROM.  Patient in agreement with treatment plan and will return to urgent care/ED as needed.     New Prescriptions    No medications on file     Final diagnoses:   Foreign body of finger of left hand     9/10/2021   [facility]     Valery Tesfaye NP  09/10/21 1211

## 2021-09-10 NOTE — ED NOTES
Using the ring cutter I was able to remove the smaller single band from finger. Protected skin and pt tolerated well. Pt wants to use ice and wait to try to get larger sing off herself before we cut it.

## 2021-11-05 ENCOUNTER — HOSPITAL ENCOUNTER (EMERGENCY)
Facility: HOSPITAL | Age: 31
Discharge: HOME | End: 2021-11-05
Attending: PHYSICIAN ASSISTANT | Admitting: PHYSICIAN ASSISTANT
Payer: COMMERCIAL

## 2021-11-05 VITALS
DIASTOLIC BLOOD PRESSURE: 77 MMHG | OXYGEN SATURATION: 98 % | HEART RATE: 99 BPM | TEMPERATURE: 98.5 F | SYSTOLIC BLOOD PRESSURE: 111 MMHG

## 2021-11-05 DIAGNOSIS — N39.0 URINARY TRACT INFECTION: ICD-10-CM

## 2021-11-05 DIAGNOSIS — N39.0 URINARY TRACT INFECTION WITHOUT HEMATURIA, SITE UNSPECIFIED: ICD-10-CM

## 2021-11-05 LAB
ALBUMIN UR-MCNC: NEGATIVE MG/DL
APPEARANCE UR: ABNORMAL
BILIRUB UR QL STRIP: NEGATIVE
COLOR UR AUTO: ABNORMAL
GLUCOSE UR STRIP-MCNC: NEGATIVE MG/DL
HGB UR QL STRIP: ABNORMAL
KETONES UR STRIP-MCNC: NEGATIVE MG/DL
LEUKOCYTE ESTERASE UR QL STRIP: ABNORMAL
MUCOUS THREADS #/AREA URNS LPF: PRESENT /LPF
NITRATE UR QL: NEGATIVE
PH UR STRIP: 5.5 [PH] (ref 4.7–8)
RBC URINE: 3 /HPF
SP GR UR STRIP: 1.01 (ref 1–1.03)
SQUAMOUS EPITHELIAL: 4 /HPF
UROBILINOGEN UR STRIP-MCNC: NORMAL MG/DL
WBC CLUMPS #/AREA URNS HPF: PRESENT /HPF
WBC URINE: 17 /HPF

## 2021-11-05 PROCEDURE — 99213 OFFICE O/P EST LOW 20 MIN: CPT | Performed by: PHYSICIAN ASSISTANT

## 2021-11-05 PROCEDURE — 81001 URINALYSIS AUTO W/SCOPE: CPT | Performed by: PHYSICIAN ASSISTANT

## 2021-11-05 PROCEDURE — 81001 URINALYSIS AUTO W/SCOPE: CPT | Performed by: STUDENT IN AN ORGANIZED HEALTH CARE EDUCATION/TRAINING PROGRAM

## 2021-11-05 PROCEDURE — 87086 URINE CULTURE/COLONY COUNT: CPT | Performed by: PHYSICIAN ASSISTANT

## 2021-11-05 PROCEDURE — G0463 HOSPITAL OUTPT CLINIC VISIT: HCPCS

## 2021-11-05 RX ORDER — CEPHALEXIN 500 MG/1
500 CAPSULE ORAL 3 TIMES DAILY
Qty: 21 CAPSULE | Refills: 0 | Status: SHIPPED | OUTPATIENT
Start: 2021-11-05 | End: 2021-11-12

## 2021-11-05 ASSESSMENT — ENCOUNTER SYMPTOMS
FREQUENCY: 1
DYSURIA: 1
FLANK PAIN: 0
ABDOMINAL PAIN: 0

## 2021-11-06 NOTE — ED PROVIDER NOTES
History     Chief Complaint   Patient presents with     Dysuria     The history is provided by the patient.     Woody Clark is a 31 year old female who presented to the urgent care ambulatory for evaluation of a 2-day history of lower urinary tract symptoms consisting of frequency and urgency.  No fevers or chills.  No flank pain.    Allergies:  Allergies   Allergen Reactions     Latex      Seasonal Allergies        Problem List:    Patient Active Problem List    Diagnosis Date Noted     Vomiting, persistent, in adult 2016     Priority: Medium     Acute pyelonephritis 2016     Priority: Medium     Persistent vomiting in adult patient 2016     Priority: Medium     Elevated liver enzymes 2016     Priority: Medium     Status post  delivery 2013     Priority: Medium     Diagnosis updated by automated process. Provider to review and confirm.       Status post  delivery 2013     Priority: Medium     Obesity 2013     Priority: Medium     SVT (supraventricular tachycardia) (H) 2013     Priority: Medium     Drug use 1st trimester 2013     Priority: Medium        Past Medical History:    Past Medical History:   Diagnosis Date     Asthma      Seasonal allergies      SVT (supraventricular tachycardia) (H)        Past Surgical History:    Past Surgical History:   Procedure Laterality Date      SECTION  2013    Procedure:  SECTION;;  Surgeon: Mally Danielson MD;  Location: [facility]     TONSILLECTOMY         Family History:    Family History   Problem Relation Age of Onset     Diabetes Sister         Type 1     Diabetes Paternal Grandmother         Type 2     Thyroid Disease Mother         Hypothyroidism     Heart Disease Maternal Grandmother         Pacemaker     Thyroid Disease Maternal Grandmother         Thyroid Disorder       Social History:  Marital Status:   [2]  Social History     Tobacco Use     Smoking status: Former  Smoker     Smokeless tobacco: Never Used   Substance Use Topics     Alcohol use: No     Drug use: No        Medications:    acetaminophen (TYLENOL) 500 MG tablet  cephALEXin (KEFLEX) 500 MG capsule          Review of Systems   Gastrointestinal: Negative for abdominal pain.   Genitourinary: Positive for dysuria and frequency. Negative for flank pain.   Skin: Negative.        Physical Exam   BP: 111/77  Pulse: 99  Temp: 98.5  F (36.9  C)  SpO2: 98 %      Physical Exam  Vitals and nursing note reviewed.   Constitutional:       General: She is not in acute distress.     Appearance: Normal appearance. She is normal weight. She is not ill-appearing, toxic-appearing or diaphoretic.   Pulmonary:      Effort: Pulmonary effort is normal.   Abdominal:      General: There is no distension.      Palpations: Abdomen is soft.      Tenderness: There is no abdominal tenderness. There is no guarding.      Comments: No flank pain   Skin:     General: Skin is warm and dry.      Capillary Refill: Capillary refill takes less than 2 seconds.   Neurological:      General: No focal deficit present.      Mental Status: She is alert and oriented to person, place, and time.   Psychiatric:         Mood and Affect: Mood normal.         ED Course        Procedures              Critical Care time:  none               Results for orders placed or performed during the hospital encounter of 11/05/21 (from the past 24 hour(s))   UA with Microscopic reflex to Culture    Specimen: Urine, Midstream   Result Value Ref Range    Color Urine Light Yellow Colorless, Straw, Light Yellow, Yellow    Appearance Urine Slightly Cloudy (A) Clear    Glucose Urine Negative Negative mg/dL    Bilirubin Urine Negative Negative    Ketones Urine Negative Negative mg/dL    Specific Gravity Urine 1.014 1.003 - 1.035    Blood Urine Moderate (A) Negative    pH Urine 5.5 4.7 - 8.0    Protein Albumin Urine Negative Negative mg/dL    Urobilinogen Urine Normal Normal, 2.0 mg/dL     Nitrite Urine Negative Negative    Leukocyte Esterase Urine Large (A) Negative    WBC Clumps Urine Present (A) None Seen /HPF    Mucus Urine Present (A) None Seen /LPF    RBC Urine 3 (H) <=2 /HPF    WBC Urine 17 (H) <=5 /HPF    Squamous Epithelials Urine 4 (H) <=1 /HPF    Narrative    Urine Culture ordered based on laboratory criteria       Medications - No data to display    Assessments & Plan (with Medical Decision Making)   Findings as above.  No red flags or high risk features.  Keflex 3 times daily for the 7 days.  Discuss future that require prompt emergency department recheck.  Increase fluids.  Patient voiced complete understanding was happy and agreeable.    This document was prepared using a combination of typing and voice generated software.  While every attempt was made for accuracy, spelling and grammatical errors may exist.    I have reviewed the nursing notes.    I have reviewed the findings, diagnosis, plan and need for follow up with the patient.       Discharge Medication List as of 11/5/2021  7:38 PM          Final diagnoses:   Urinary tract infection       11/5/2021   [facility]     Manisha Dolan PA-C  11/05/21 1943

## 2021-11-06 NOTE — ED TRIAGE NOTES
Pt presents with a sharp pain to her right lower back, intermittent pain when she passes urine for 2 days.

## 2021-11-07 LAB — BACTERIA UR CULT: NORMAL

## 2021-12-31 ENCOUNTER — HOSPITAL ENCOUNTER (EMERGENCY)
Facility: HOSPITAL | Age: 31
Discharge: HOME | End: 2021-12-31
Attending: STUDENT IN AN ORGANIZED HEALTH CARE EDUCATION/TRAINING PROGRAM | Admitting: STUDENT IN AN ORGANIZED HEALTH CARE EDUCATION/TRAINING PROGRAM
Payer: COMMERCIAL

## 2021-12-31 VITALS
SYSTOLIC BLOOD PRESSURE: 111 MMHG | RESPIRATION RATE: 18 BRPM | TEMPERATURE: 98.4 F | WEIGHT: 215 LBS | BODY MASS INDEX: 34.7 KG/M2 | OXYGEN SATURATION: 96 % | HEART RATE: 68 BPM | DIASTOLIC BLOOD PRESSURE: 67 MMHG

## 2021-12-31 DIAGNOSIS — G89.29 CHRONIC MIDLINE LOW BACK PAIN WITH LEFT-SIDED SCIATICA: ICD-10-CM

## 2021-12-31 DIAGNOSIS — M54.42 CHRONIC MIDLINE LOW BACK PAIN WITH LEFT-SIDED SCIATICA: ICD-10-CM

## 2021-12-31 PROCEDURE — 250N000013 HC RX MED GY IP 250 OP 250 PS 637: Performed by: STUDENT IN AN ORGANIZED HEALTH CARE EDUCATION/TRAINING PROGRAM

## 2021-12-31 PROCEDURE — 250N000011 HC RX IP 250 OP 636: Performed by: STUDENT IN AN ORGANIZED HEALTH CARE EDUCATION/TRAINING PROGRAM

## 2021-12-31 PROCEDURE — 96372 THER/PROPH/DIAG INJ SC/IM: CPT | Performed by: STUDENT IN AN ORGANIZED HEALTH CARE EDUCATION/TRAINING PROGRAM

## 2021-12-31 PROCEDURE — 99284 EMERGENCY DEPT VISIT MOD MDM: CPT | Mod: 25

## 2021-12-31 PROCEDURE — 99283 EMERGENCY DEPT VISIT LOW MDM: CPT

## 2021-12-31 PROCEDURE — 99283 EMERGENCY DEPT VISIT LOW MDM: CPT | Performed by: STUDENT IN AN ORGANIZED HEALTH CARE EDUCATION/TRAINING PROGRAM

## 2021-12-31 RX ORDER — ACETAMINOPHEN 325 MG/1
975 TABLET ORAL ONCE
Status: COMPLETED | OUTPATIENT
Start: 2021-12-31 | End: 2021-12-31

## 2021-12-31 RX ORDER — CYCLOBENZAPRINE HCL 10 MG
10 TABLET ORAL ONCE
Status: COMPLETED | OUTPATIENT
Start: 2021-12-31 | End: 2021-12-31

## 2021-12-31 RX ORDER — IBUPROFEN 200 MG
200 TABLET ORAL EVERY 4 HOURS PRN
COMMUNITY

## 2021-12-31 RX ORDER — KETOROLAC TROMETHAMINE 30 MG/ML
30 INJECTION, SOLUTION INTRAMUSCULAR; INTRAVENOUS ONCE
Status: COMPLETED | OUTPATIENT
Start: 2021-12-31 | End: 2021-12-31

## 2021-12-31 RX ORDER — CYCLOBENZAPRINE HCL 10 MG
10 TABLET ORAL 3 TIMES DAILY PRN
Qty: 30 TABLET | Refills: 0 | Status: SHIPPED | OUTPATIENT
Start: 2021-12-31 | End: 2022-01-10

## 2021-12-31 RX ORDER — OXYCODONE HYDROCHLORIDE 5 MG/1
5 TABLET ORAL ONCE
Status: COMPLETED | OUTPATIENT
Start: 2021-12-31 | End: 2021-12-31

## 2021-12-31 RX ORDER — LIDOCAINE 4 G/G
1 PATCH TOPICAL ONCE
Status: DISCONTINUED | OUTPATIENT
Start: 2021-12-31 | End: 2021-12-31 | Stop reason: HOSPADM

## 2021-12-31 RX ORDER — OXYCODONE HYDROCHLORIDE 5 MG/1
10 TABLET ORAL ONCE
Status: COMPLETED | OUTPATIENT
Start: 2021-12-31 | End: 2021-12-31

## 2021-12-31 RX ADMIN — OXYCODONE HYDROCHLORIDE 5 MG: 5 TABLET ORAL at 20:41

## 2021-12-31 RX ADMIN — ACETAMINOPHEN 975 MG: 325 TABLET, FILM COATED ORAL at 19:25

## 2021-12-31 RX ADMIN — CYCLOBENZAPRINE HYDROCHLORIDE 10 MG: 10 TABLET, FILM COATED ORAL at 19:25

## 2021-12-31 RX ADMIN — OXYCODONE HYDROCHLORIDE 10 MG: 5 TABLET ORAL at 19:25

## 2021-12-31 RX ADMIN — KETOROLAC TROMETHAMINE 30 MG: 30 INJECTION, SOLUTION INTRAMUSCULAR; INTRAVENOUS at 19:27

## 2021-12-31 RX ADMIN — Medication 1 PATCH: at 19:26
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
## 2022-01-01 NOTE — DISCHARGE INSTRUCTIONS
Return to the emergency department if you have worsening symptoms or new concerning symptoms you should use ibuprofen and Tylenol for pain control, do not take more than 4 g of Tylenol per day, do not take more than 3200 mg of ibuprofen in a day.  You can stagger back and forth between the 2 medications such that when one is wearing off the other one is working.  For him since he can take Tylenol at 12:00 and 6:00, and in between you can use an ibuprofen at 3 PM.  Make sure you are taking at least 600 mg at a time and you are taking with food so you do not get an ulcer.  You can put ice on your back or lidocaine patch and see if that helps.  If it helps keep doing it, if it does not, do not add heat.  You do not need to.  I also want you to use Flexeril.  Use it as directed, it is a muscle relaxant, will hopefully reduce the frequency and severity of the muscle spasms.  Please follow-up with your primary care doctor within a week.  Call to schedule an appointment
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
## 2022-01-22 ENCOUNTER — HEALTH MAINTENANCE LETTER (OUTPATIENT)
Age: 32
End: 2022-01-22

## 2022-05-16 NOTE — PROGRESS NOTES
1    Subjective Finding:    Chief compalint: Patient presents with:  Back Pain: getting better  , Pain Scale: 5/10, Intensity: sharp, Duration: 5 days, Radiating: no.    Date of injury:     Activities that the pain restricts:   Home/household/hobbies/social activities: yes.  Work duties: no.  Sleep: no.  Makes symptoms better: rest.  Makes symptoms worse: lumbar flexion.  Have you seen anyone else for the symptoms? No.  Work related: no.  Automobile related injury: no.    Objective and Assessment:    Posture Analysis:   High shoulder: .  Head tilt: .  High iliac crest: .  Head carriage: neutral.  Thoracic Kyphosis: neutral.  Lumbar Lordosis: forward.    Lumbar Range of Motion: extension decreased.  Cervical Range of Motion: .  Thoracic Range of Motion: extension decreased.  Extremity Range of Motion: .    Palpation:   Quad lumb: bilateral, referred pain: no  T paraspinals: dull pain, no    Segmental dysfunction pre-treatment and treatment area: T67  L45.   C6  Assessment post-treatment:  Cervical: ROM increased.  Thoracic: ROM increased.  Lumbar: ROM increased.    Comments: .      Complicating Factors: .    Procedure(s):  [facility]:  37810 Chiropractic manipulative treatment 3-4 regions performed   Cervical: Diversified, See above for level, Supine, Thoracic: Diversified, See above for level, Prone and Lumbar: Diversified, See above for level, Side posture    Modalities:  None performed this visit    Therapeutic procedures:  None    Plan:  Treatment plan: PRN.  Instructed patient: ice 20 minutes every other hour as needed.  Short term goals: increase ROM.  Long term goals: increase ADL.  Prognosis: excellent.              Protopic Pregnancy And Lactation Text: This medication is Pregnancy Category C. It is unknown if this medication is excreted in breast milk when applied topically.

## 2022-09-04 ENCOUNTER — HEALTH MAINTENANCE LETTER (OUTPATIENT)
Age: 32
End: 2022-09-04

## 2023-04-29 ENCOUNTER — HEALTH MAINTENANCE LETTER (OUTPATIENT)
Age: 33
End: 2023-04-29

## 2023-11-29 ENCOUNTER — OFFICE VISIT (OUTPATIENT)
Dept: CHIROPRACTIC MEDICINE | Facility: OTHER | Age: 33
End: 2023-11-29
Attending: CHIROPRACTOR
Payer: COMMERCIAL

## 2023-11-29 DIAGNOSIS — M99.03 SEGMENTAL AND SOMATIC DYSFUNCTION OF LUMBAR REGION: ICD-10-CM

## 2023-11-29 DIAGNOSIS — M54.2 CERVICALGIA: ICD-10-CM

## 2023-11-29 DIAGNOSIS — M99.02 SEGMENTAL AND SOMATIC DYSFUNCTION OF THORACIC REGION: ICD-10-CM

## 2023-11-29 DIAGNOSIS — M99.01 SEGMENTAL AND SOMATIC DYSFUNCTION OF CERVICAL REGION: Primary | ICD-10-CM

## 2023-11-29 PROCEDURE — 99212 OFFICE O/P EST SF 10 MIN: CPT | Mod: 25 | Performed by: CHIROPRACTOR

## 2023-11-29 PROCEDURE — 98941 CHIROPRACT MANJ 3-4 REGIONS: CPT | Mod: AT | Performed by: CHIROPRACTOR

## 2023-11-29 NOTE — PROGRESS NOTES
Subjective Finding:    Chief compalint: Patient presents with:  Neck Pain: Neck and back pain   , Pain Scale: 5/10, Intensity: sharp, Duration: 2 months, Radiating: no.    Date of injury:     Activities that the pain restricts:   Home/household/hobbies/social activities: Yes.  Work duties: Yes.  Sleep: No.  Makes symptoms better: rest.  Makes symptoms worse: activity and lumbar flexion.  Have you seen anyone else for the symptoms? No.  Work related: No.  Automobile related injury: No.    Objective and Assessment:    Posture Analysis:   High shoulder: .  Head tilt: .  High iliac crest: .  Head carriage: neutral.  Thoracic Kyphosis: neutral.  Lumbar Lordosis: forward.    Lumbar Range of Motion: extension decreased.  Cervical Range of Motion: extension decreased.  Thoracic Range of Motion: .  Extremity Range of Motion: .    Palpation:   Quad lumb: bilateral, referred pain: no    Segmental dysfunction pre-treatment and treatment area: C4, C5, T5, and L5.    Assessment post-treatment:  Cervical: ROM increased.  Thoracic: ROM increased.  Lumbar: ROM increased.    Comments: .      Complicating Factors: .    Procedure(s):  [facility]:  33352 Chiropractic manipulative treatment 3-4 regions performed   Cervical: Diversified, See above for level, Supine, Thoracic: Diversified, See above for level, Prone, and Lumbar: Diversified, See above for level, Side posture    Modalities:  None performed this visit    Therapeutic procedures:  None    Plan:  Treatment plan: PRN.  Instructed patient: walk 10 minutes.  Short term goals: reduce pain.  Long term goals: restore normal function.  Prognosis: excellent.

## 2023-12-14 ENCOUNTER — OFFICE VISIT (OUTPATIENT)
Dept: CHIROPRACTIC MEDICINE | Facility: OTHER | Age: 33
End: 2023-12-14
Payer: COMMERCIAL

## 2023-12-14 DIAGNOSIS — M99.03 SEGMENTAL AND SOMATIC DYSFUNCTION OF LUMBAR REGION: Primary | ICD-10-CM

## 2023-12-14 DIAGNOSIS — M99.01 SEGMENTAL AND SOMATIC DYSFUNCTION OF CERVICAL REGION: ICD-10-CM

## 2023-12-14 DIAGNOSIS — M54.50 ACUTE BILATERAL LOW BACK PAIN WITHOUT SCIATICA: ICD-10-CM

## 2023-12-14 DIAGNOSIS — M99.02 SEGMENTAL AND SOMATIC DYSFUNCTION OF THORACIC REGION: ICD-10-CM

## 2023-12-14 PROCEDURE — 98941 CHIROPRACT MANJ 3-4 REGIONS: CPT | Mod: AT | Performed by: CHIROPRACTOR

## 2023-12-18 NOTE — PROGRESS NOTES
Subjective Finding:    Chief compalint: Patient presents with:  Back Pain: Tightness in lower back  , Pain Scale: 3/10, Intensity: sharp, Duration: 2 months, Radiating: no.    Date of injury:     Activities that the pain restricts:   Home/household/hobbies/social activities: Yes.  Work duties: Yes.  Sleep: No.  Makes symptoms better: rest.  Makes symptoms worse: activity and lumbar flexion.  Have you seen anyone else for the symptoms? No.  Work related: No.  Automobile related injury: No.    Objective and Assessment:    Posture Analysis:   High shoulder: .  Head tilt: .  High iliac crest: .  Head carriage: neutral.  Thoracic Kyphosis: neutral.  Lumbar Lordosis: forward.    Lumbar Range of Motion: extension decreased.  Cervical Range of Motion: extension decreased.  Thoracic Range of Motion: .  Extremity Range of Motion: .    Palpation:   Quad lumb: bilateral, referred pain: no    Segmental dysfunction pre-treatment and treatment area: C5  T67  L345.    Assessment post-treatment:  Cervical: ROM increased.  Thoracic: ROM increased.  Lumbar: ROM increased.    Comments: .  Good improvement after tx.  Rom was better        Complicating Factors: .    Procedure(s):  [facility]:  34431 Chiropractic manipulative treatment 3-4 regions performed   Cervical: Diversified, See above for level, Supine, Thoracic: Diversified, See above for level, Prone, and Lumbar: Diversified, See above for level, Side posture    Modalities:  None performed this visit    Therapeutic procedures:  None    Plan:  Treatment plan: PRN.  Instructed patient: walk 10 minutes.  Short term goals: reduce pain.  Long term goals: restore normal function.  Prognosis: excellent.

## 2024-01-29 ENCOUNTER — OFFICE VISIT (OUTPATIENT)
Dept: CHIROPRACTIC MEDICINE | Facility: OTHER | Age: 34
End: 2024-01-29
Attending: CHIROPRACTOR
Payer: COMMERCIAL

## 2024-01-29 DIAGNOSIS — M54.50 ACUTE BILATERAL LOW BACK PAIN WITHOUT SCIATICA: ICD-10-CM

## 2024-01-29 DIAGNOSIS — M99.02 SEGMENTAL AND SOMATIC DYSFUNCTION OF THORACIC REGION: ICD-10-CM

## 2024-01-29 DIAGNOSIS — M99.01 SEGMENTAL AND SOMATIC DYSFUNCTION OF CERVICAL REGION: ICD-10-CM

## 2024-01-29 DIAGNOSIS — M99.03 SEGMENTAL AND SOMATIC DYSFUNCTION OF LUMBAR REGION: Primary | ICD-10-CM

## 2024-01-29 PROCEDURE — 98941 CHIROPRACT MANJ 3-4 REGIONS: CPT | Mod: AT | Performed by: CHIROPRACTOR

## 2024-02-05 NOTE — PROGRESS NOTES
Subjective Finding:    Chief compalint: Patient presents with:  Back Pain: Still having lower back pain    , Pain Scale: 3/10, Intensity: sharp, Duration: 2 months, Radiating: no.    Date of injury:     Activities that the pain restricts:   Home/household/hobbies/social activities: Yes.  Work duties: Yes.  Sleep: No.  Makes symptoms better: rest.  Makes symptoms worse: activity and lumbar flexion.  Have you seen anyone else for the symptoms? No.  Work related: No.  Automobile related injury: No.    Objective and Assessment:    Posture Analysis:   High shoulder: .  Head tilt: .  High iliac crest: .  Head carriage: neutral.  Thoracic Kyphosis: neutral.  Lumbar Lordosis: forward.    Lumbar Range of Motion: extension decreased.  Cervical Range of Motion: extension decreased.  Thoracic Range of Motion: .  Extremity Range of Motion: .    Palpation:   Quad lumb: bilateral, referred pain: no    Segmental dysfunction pre-treatment and treatment area: C5  T67  L345.    Assessment post-treatment:  Cervical: ROM increased.  Thoracic: ROM increased.  Lumbar: ROM increased.    Comments: .  Good improvement after tx.  Rom was better        Complicating Factors: .    Procedure(s):  [facility]:  [address]16 Chiropractic manipulative treatment 3-4 regions performed   Cervical: Diversified, See above for level, Supine, Thoracic: Diversified, See above for level, Prone, and Lumbar: Diversified, See above for level, Side posture    Modalities:  None performed this visit    Therapeutic procedures:  None    Plan:  Treatment plan: PRN.  Instructed patient: walk 10 minutes.  Short term goals: reduce pain.  Long term goals: restore normal function.  Prognosis: excellent.

## 2024-07-06 ENCOUNTER — HEALTH MAINTENANCE LETTER (OUTPATIENT)
Age: 34
End: 2024-07-06

## 2024-09-19 ENCOUNTER — HOSPITAL ENCOUNTER (EMERGENCY)
Facility: HOSPITAL | Age: 34
Discharge: HOME | End: 2024-09-19
Attending: NURSE PRACTITIONER | Admitting: NURSE PRACTITIONER
Payer: COMMERCIAL

## 2024-09-19 VITALS
DIASTOLIC BLOOD PRESSURE: 78 MMHG | RESPIRATION RATE: 18 BRPM | SYSTOLIC BLOOD PRESSURE: 115 MMHG | HEART RATE: 68 BPM | TEMPERATURE: 97 F | OXYGEN SATURATION: 98 %

## 2024-09-19 DIAGNOSIS — N30.01 ACUTE CYSTITIS WITH HEMATURIA: ICD-10-CM

## 2024-09-19 LAB
ALBUMIN UR-MCNC: 20 MG/DL
APPEARANCE UR: CLEAR
BACTERIA #/AREA URNS HPF: ABNORMAL /HPF
BACTERIAL VAGINOSIS VAG-IMP: POSITIVE
BILIRUB UR QL STRIP: NEGATIVE
CANDIDA DNA VAG QL NAA+PROBE: NOT DETECTED
CANDIDA GLABRATA / CANDIDA KRUSEI DNA: NOT DETECTED
CAOX CRY #/AREA URNS HPF: ABNORMAL /HPF
COLOR UR AUTO: ABNORMAL
GLUCOSE UR STRIP-MCNC: NEGATIVE MG/DL
HGB UR QL STRIP: ABNORMAL
KETONES UR STRIP-MCNC: NEGATIVE MG/DL
LEUKOCYTE ESTERASE UR QL STRIP: ABNORMAL
MUCOUS THREADS #/AREA URNS LPF: PRESENT /LPF
NITRATE UR QL: NEGATIVE
PH UR STRIP: 6.5 [PH] (ref 4.7–8)
RBC URINE: 2 /HPF
SP GR UR STRIP: 1.01 (ref 1–1.03)
SQUAMOUS EPITHELIAL: 1 /HPF
T VAGINALIS DNA VAG QL NAA+PROBE: NOT DETECTED
UROBILINOGEN UR STRIP-MCNC: NORMAL MG/DL
WBC URINE: 70 /HPF

## 2024-09-19 PROCEDURE — 0352U MULTIPLEX VAGINAL PANEL BY PCR: CPT | Performed by: NURSE PRACTITIONER

## 2024-09-19 PROCEDURE — G0463 HOSPITAL OUTPT CLINIC VISIT: HCPCS

## 2024-09-19 PROCEDURE — 99213 OFFICE O/P EST LOW 20 MIN: CPT | Performed by: NURSE PRACTITIONER

## 2024-09-19 PROCEDURE — 81001 URINALYSIS AUTO W/SCOPE: CPT | Performed by: NURSE PRACTITIONER

## 2024-09-19 PROCEDURE — 87086 URINE CULTURE/COLONY COUNT: CPT | Performed by: NURSE PRACTITIONER

## 2024-09-19 RX ORDER — METRONIDAZOLE 7.5 MG/G
1 GEL VAGINAL AT BEDTIME
Qty: 70 G | Refills: 0 | Status: SHIPPED | OUTPATIENT
Start: 2024-09-19 | End: 2024-09-24

## 2024-09-19 RX ORDER — SULFAMETHOXAZOLE/TRIMETHOPRIM 800-160 MG
1 TABLET ORAL 2 TIMES DAILY
Qty: 14 TABLET | Refills: 0 | Status: SHIPPED | OUTPATIENT
Start: 2024-09-19 | End: 2024-09-26

## 2024-09-19 ASSESSMENT — ENCOUNTER SYMPTOMS
PSYCHIATRIC NEGATIVE: 1
FEVER: 0
CHILLS: 0
SHORTNESS OF BREATH: 0
DYSURIA: 1
ABDOMINAL PAIN: 0
FREQUENCY: 1
NAUSEA: 0
DIARRHEA: 0
FLANK PAIN: 1
VOMITING: 0
HEMATURIA: 0

## 2024-09-19 ASSESSMENT — COLUMBIA-SUICIDE SEVERITY RATING SCALE - C-SSRS
6. HAVE YOU EVER DONE ANYTHING, STARTED TO DO ANYTHING, OR PREPARED TO DO ANYTHING TO END YOUR LIFE?: NO
2. HAVE YOU ACTUALLY HAD ANY THOUGHTS OF KILLING YOURSELF IN THE PAST MONTH?: NO
1. IN THE PAST MONTH, HAVE YOU WISHED YOU WERE DEAD OR WISHED YOU COULD GO TO SLEEP AND NOT WAKE UP?: NO

## 2024-09-19 ASSESSMENT — ACTIVITIES OF DAILY LIVING (ADL): ADLS_ACUITY_SCORE: 37

## 2024-09-19 NOTE — ED TRIAGE NOTES
C/o uti sx    Frequency, urgency, bladder pain, flank pain     Sx started 2 days ago    Pyridium

## 2024-09-19 NOTE — ED NOTES
ROSA Tesfaye CNP assessed patient in triage and determined patient Urgent Care appropriate. Will be seen in Urgent Care.

## 2024-09-19 NOTE — DISCHARGE INSTRUCTIONS
Bactrim as ordered  - Take entire course of antibiotic even if you start to feel better.  - Antibiotics can cause stomach upset including nausea and diarrhea. Read your bottle or ask the pharmacist if antibiotic can be taken with food to help prevent nausea. If you have symptoms of diarrhea you can take an over-the-counter probiotic and/or increase foods with probiotics such as yogurt, Daniel, sauerkraut.    Push fluids    Follow-up with primary care provider or return to urgent care/ED with any worsening in condition or additional concerns.

## 2024-09-19 NOTE — ED PROVIDER NOTES
History     Chief Complaint   Patient presents with    Dysuria     HPI  Woody Clark is a 34 year old female who presents to urgent care today ambulatory with complaints of dysuria frequency and flank pain which started 2 days ago.  Denies any hematuria.  Denies any fever, chills, nausea, vomiting, diarrhea, shortness of breath or chest pain.  No history of kidney stones.  No rashes, genital sores, redness or irritation, denies any concerns for STDs, declines testing today at this time.  History of bacterial vaginosis.  Took Pyridium, no other OTC meds.  No other concerns.    Allergies:  Allergies   Allergen Reactions    Latex     Seasonal Allergies        Problem List:    Patient Active Problem List    Diagnosis Date Noted    Vomiting, persistent, in adult 2016     Priority: Medium    Acute pyelonephritis 2016     Priority: Medium    Persistent vomiting in adult patient 2016     Priority: Medium    Elevated liver enzymes 2016     Priority: Medium    Status post  delivery 2013     Priority: Medium     Diagnosis updated by automated process. Provider to review and confirm.      Status post  delivery 2013     Priority: Medium    Obesity 2013     Priority: Medium    SVT (supraventricular tachycardia) (H24) 2013     Priority: Medium    Drug use 1st trimester 2013     Priority: Medium        Past Medical History:    Past Medical History:   Diagnosis Date    Asthma     Seasonal allergies     SVT (supraventricular tachycardia) (H)        Past Surgical History:    Past Surgical History:   Procedure Laterality Date     SECTION  2013    Procedure:  SECTION;;  Surgeon: Mally Danielson MD;  Location: [facility]    TONSILLECTOMY         Family History:    Family History   Problem Relation Age of Onset    Diabetes Sister         Type 1    Diabetes Paternal Grandmother         Type 2    Thyroid Disease Mother         Hypothyroidism    Heart  Disease Maternal Grandmother         Pacemaker    Thyroid Disease Maternal Grandmother         Thyroid Disorder       Social History:  Marital Status:   [2]  Social History     Tobacco Use    Smoking status: Former    Smokeless tobacco: Never   Substance Use Topics    Alcohol use: No    Drug use: No        Medications:    sulfamethoxazole-trimethoprim (BACTRIM DS) 800-160 MG tablet  acetaminophen (TYLENOL) 500 MG tablet  ibuprofen (ADVIL/MOTRIN) 200 MG tablet      Review of Systems   Constitutional:  Negative for chills and fever.   Respiratory:  Negative for shortness of breath.    Cardiovascular:  Negative for chest pain.   Gastrointestinal:  Negative for abdominal pain, diarrhea, nausea and vomiting.   Genitourinary:  Positive for dysuria, flank pain and frequency. Negative for genital sores, hematuria, pelvic pain, vaginal bleeding, vaginal discharge and vaginal pain.   Musculoskeletal:  Negative for gait problem.   Skin:  Negative for rash.   Psychiatric/Behavioral: Negative.       Physical Exam   BP: 115/78  Pulse: 68  Temp: 97  F (36.1  C)  Resp: 18  SpO2: 98 %    Physical Exam  Vitals and nursing note reviewed.   Constitutional:       General: She is not in acute distress.     Appearance: Normal appearance. She is not ill-appearing or toxic-appearing.   Cardiovascular:      Rate and Rhythm: Normal rate and regular rhythm.      Pulses: Normal pulses.      Heart sounds: Normal heart sounds.   Pulmonary:      Effort: Pulmonary effort is normal.      Breath sounds: Normal breath sounds.   Abdominal:      General: Bowel sounds are normal.      Palpations: Abdomen is soft.      Tenderness: There is no right CVA tenderness or left CVA tenderness.   Neurological:      Mental Status: She is alert.   Psychiatric:         Mood and Affect: Mood normal.       ED Course     Procedures    Results for orders placed or performed during the hospital encounter of 09/19/24 (from the past 24 hour(s))   UA with Microscopic  reflex to Culture    Specimen: Urine, Clean Catch   Result Value Ref Range    Color Urine Light Yellow Colorless, Straw, Light Yellow, Yellow    Appearance Urine Clear Clear    Glucose Urine Negative Negative mg/dL    Bilirubin Urine Negative Negative    Ketones Urine Negative Negative mg/dL    Specific Gravity Urine 1.007 1.003 - 1.035    Blood Urine Moderate (A) Negative    pH Urine 6.5 4.7 - 8.0    Protein Albumin Urine 20 (A) Negative mg/dL    Urobilinogen Urine Normal Normal, 2.0 mg/dL    Nitrite Urine Negative Negative    Leukocyte Esterase Urine Large (A) Negative    Bacteria Urine Few (A) None Seen /HPF    Mucus Urine Present (A) None Seen /LPF    Calcium Oxalate Crystals Urine Few (A) None Seen /HPF    RBC Urine 2 <=2 /HPF    WBC Urine 70 (H) <=5 /HPF    Squamous Epithelials Urine 1 <=1 /HPF    Narrative    Urine Culture ordered based on laboratory criteria       Medications - No data to display    Assessments & Plan (with Medical Decision Making)     I have reviewed the nursing notes.    I have reviewed the findings, diagnosis, plan and need for follow up with the patient.  (N30.01) Acute cystitis with hematuria  Plan:   Patient ambulatory with a nontoxic appearance.  Patient arrived with dysuria, frequency and back pain.  Denies any fever, chills, nausea, vomiting, diarrhea, shortness of breath or chest pain.  Denies any history of kidney stones.  Denies any recent fall, injury or trauma.  Denies any risk of STDs, genital sores, rashes, irritation or discharge.  No abdominal pain, no CVA tenderness.  UA indicates urinary tract infection, did offer CT for stone rule out and patient declines at this time, patient wants to try an antibiotic and follow-up as needed.  Will start patient on Bactrim.  Push fluids.  Urine culture pending.  Follow-up with primary care provider or return to urgent care/ED with any worsening in condition or additional concerns.  Patient in agreement treatment plan.    New  Prescriptions    SULFAMETHOXAZOLE-TRIMETHOPRIM (BACTRIM DS) 800-160 MG TABLET    Take 1 tablet by mouth 2 times daily for 7 days.     Final diagnoses:   Acute cystitis with hematuria     9/19/2024   [facility]       Valery Tesfaye NP  09/19/24 1137

## 2024-09-21 LAB — BACTERIA UR CULT: ABNORMAL

## 2025-07-13 ENCOUNTER — HEALTH MAINTENANCE LETTER (OUTPATIENT)
Age: 35
End: 2025-07-13